# Patient Record
Sex: FEMALE | Race: BLACK OR AFRICAN AMERICAN | NOT HISPANIC OR LATINO | Employment: FULL TIME | URBAN - METROPOLITAN AREA
[De-identification: names, ages, dates, MRNs, and addresses within clinical notes are randomized per-mention and may not be internally consistent; named-entity substitution may affect disease eponyms.]

---

## 2017-01-15 ENCOUNTER — HOSPITAL ENCOUNTER (EMERGENCY)
Facility: HOSPITAL | Age: 37
Discharge: HOME | End: 2017-01-15
Attending: EMERGENCY MEDICINE | Admitting: EMERGENCY MEDICINE
Payer: MEDICARE

## 2017-01-15 ENCOUNTER — HOSPITAL ENCOUNTER (EMERGENCY)
Facility: HOSPITAL | Age: 37
Discharge: HOME | End: 2017-01-15
Attending: EMERGENCY MEDICINE
Payer: COMMERCIAL

## 2017-01-15 ENCOUNTER — APPOINTMENT (EMERGENCY)
Dept: RADIOLOGY | Facility: HOSPITAL | Age: 37
End: 2017-01-15
Payer: COMMERCIAL

## 2017-01-15 VITALS
WEIGHT: 225.6 LBS | OXYGEN SATURATION: 100 % | TEMPERATURE: 97.9 F | BODY MASS INDEX: 34.3 KG/M2 | HEART RATE: 100 BPM | RESPIRATION RATE: 18 BRPM | DIASTOLIC BLOOD PRESSURE: 92 MMHG | SYSTOLIC BLOOD PRESSURE: 141 MMHG

## 2017-01-15 VITALS
SYSTOLIC BLOOD PRESSURE: 144 MMHG | OXYGEN SATURATION: 97 % | DIASTOLIC BLOOD PRESSURE: 92 MMHG | RESPIRATION RATE: 18 BRPM | TEMPERATURE: 97.5 F | BODY MASS INDEX: 35.6 KG/M2 | WEIGHT: 234.13 LBS | HEART RATE: 89 BPM
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
## 2017-01-15 DIAGNOSIS — M25.561 ACUTE PAIN OF RIGHT KNEE: Primary | ICD-10-CM

## 2017-01-15 DIAGNOSIS — M25.461 EFFUSION OF RIGHT KNEE JOINT: ICD-10-CM

## 2017-01-15 DIAGNOSIS — S80.12XA: ICD-10-CM

## 2017-01-15 DIAGNOSIS — S00.81XA ABRASION OF FOREHEAD, INITIAL ENCOUNTER: Primary | ICD-10-CM

## 2017-01-15 PROCEDURE — 99283 EMERGENCY DEPT VISIT LOW MDM: CPT

## 2017-01-15 PROCEDURE — 73564 X-RAY EXAM KNEE 4 OR MORE: CPT

## 2017-01-15 PROCEDURE — 99284 EMERGENCY DEPT VISIT MOD MDM: CPT

## 2017-01-15 RX ORDER — GINSENG 100 MG
1 CAPSULE ORAL ONCE
Status: COMPLETED | OUTPATIENT
Start: 2017-01-15 | End: 2017-01-15

## 2017-01-15 RX ORDER — OXYCODONE HYDROCHLORIDE AND ACETAMINOPHEN 5; 325 MG/1; MG/1
1 TABLET ORAL ONCE
Status: COMPLETED | OUTPATIENT
Start: 2017-01-15 | End: 2017-01-15

## 2017-01-15 RX ORDER — IBUPROFEN 800 MG/1
800 TABLET ORAL EVERY 8 HOURS PRN
Qty: 21 TABLET | Refills: 0 | Status: SHIPPED | OUTPATIENT
Start: 2017-01-15 | End: 2021-02-23 | Stop reason: ALTCHOICE

## 2017-01-15 RX ORDER — HYDROCODONE BITARTRATE AND ACETAMINOPHEN 5; 325 MG/1; MG/1
1 TABLET ORAL EVERY 6 HOURS PRN
Qty: 20 TABLET | Refills: 0 | Status: SHIPPED | OUTPATIENT
Start: 2017-01-15 | End: 2017-01-25

## 2017-01-15 RX ADMIN — OXYCODONE HYDROCHLORIDE AND ACETAMINOPHEN 1 TABLET: 5; 325 TABLET ORAL at 17:55

## 2017-01-15 RX ADMIN — BACITRACIN ZINC 1 SMALL APPLICATION: 500 OINTMENT TOPICAL at 23:25

## 2019-11-06 ENCOUNTER — HOSPITAL ENCOUNTER (EMERGENCY)
Facility: HOSPITAL | Age: 39
Discharge: HOME | End: 2019-11-06
Attending: EMERGENCY MEDICINE | Admitting: EMERGENCY MEDICINE
Payer: MEDICARE

## 2019-11-06 VITALS
OXYGEN SATURATION: 99 % | DIASTOLIC BLOOD PRESSURE: 95 MMHG | BODY MASS INDEX: 29.67 KG/M2 | WEIGHT: 195.11 LBS | HEART RATE: 86 BPM | RESPIRATION RATE: 18 BRPM | TEMPERATURE: 97.4 F | SYSTOLIC BLOOD PRESSURE: 128 MMHG

## 2019-11-06 DIAGNOSIS — N92.1 MENOMETRORRHAGIA: Primary | ICD-10-CM

## 2019-11-06 LAB
ANION GAP SERPL CALCULATED.3IONS-SCNC: 12 MMOL/L (ref 4–13)
BASOPHILS # BLD AUTO: 0.03 THOUSANDS/ΜL (ref 0–0.1)
BASOPHILS NFR BLD AUTO: 1 % (ref 0–1)
BUN SERPL-MCNC: 7 MG/DL (ref 5–25)
CALCIUM SERPL-MCNC: 8.5 MG/DL (ref 8.3–10.1)
CHLORIDE SERPL-SCNC: 105 MMOL/L (ref 100–108)
CO2 SERPL-SCNC: 22 MMOL/L (ref 21–32)
CREAT SERPL-MCNC: 0.69 MG/DL (ref 0.6–1.3)
EOSINOPHIL # BLD AUTO: 0.11 THOUSAND/ΜL (ref 0–0.61)
EOSINOPHIL NFR BLD AUTO: 2 % (ref 0–6)
ERYTHROCYTE [DISTWIDTH] IN BLOOD BY AUTOMATED COUNT: 12.2 % (ref 11.6–15.1)
EXT PREG TEST URINE: NEGATIVE
EXT. CONTROL ED NAV: NORMAL
GFR SERPL CREATININE-BSD FRML MDRD: 126 ML/MIN/1.73SQ M
GLUCOSE SERPL-MCNC: 84 MG/DL (ref 65–140)
HCT VFR BLD AUTO: 45.6 % (ref 34.8–46.1)
HGB BLD-MCNC: 15.1 G/DL (ref 11.5–15.4)
IMM GRANULOCYTES # BLD AUTO: 0.01 THOUSAND/UL (ref 0–0.2)
IMM GRANULOCYTES NFR BLD AUTO: 0 % (ref 0–2)
LYMPHOCYTES # BLD AUTO: 2 THOUSANDS/ΜL (ref 0.6–4.47)
LYMPHOCYTES NFR BLD AUTO: 38 % (ref 14–44)
MCH RBC QN AUTO: 32.1 PG (ref 26.8–34.3)
MCHC RBC AUTO-ENTMCNC: 33.1 G/DL (ref 31.4–37.4)
MCV RBC AUTO: 97 FL (ref 82–98)
MONOCYTES # BLD AUTO: 0.49 THOUSAND/ΜL (ref 0.17–1.22)
MONOCYTES NFR BLD AUTO: 9 % (ref 4–12)
NEUTROPHILS # BLD AUTO: 2.67 THOUSANDS/ΜL (ref 1.85–7.62)
NEUTS SEG NFR BLD AUTO: 50 % (ref 43–75)
NRBC BLD AUTO-RTO: 0 /100 WBCS
PLATELET # BLD AUTO: 215 THOUSANDS/UL (ref 149–390)
PMV BLD AUTO: 12.2 FL (ref 8.9–12.7)
POTASSIUM SERPL-SCNC: 4 MMOL/L (ref 3.5–5.3)
RBC # BLD AUTO: 4.71 MILLION/UL (ref 3.81–5.12)
SODIUM SERPL-SCNC: 139 MMOL/L (ref 136–145)
WBC # BLD AUTO: 5.31 THOUSAND/UL (ref 4.31–10.16)

## 2019-11-06 PROCEDURE — 81025 URINE PREGNANCY TEST: CPT | Performed by: EMERGENCY MEDICINE

## 2019-11-06 PROCEDURE — 85025 COMPLETE CBC W/AUTO DIFF WBC: CPT | Performed by: EMERGENCY MEDICINE

## 2019-11-06 PROCEDURE — 99283 EMERGENCY DEPT VISIT LOW MDM: CPT

## 2019-11-06 PROCEDURE — 96361 HYDRATE IV INFUSION ADD-ON: CPT

## 2019-11-06 PROCEDURE — 96374 THER/PROPH/DIAG INJ IV PUSH: CPT

## 2019-11-06 PROCEDURE — 36415 COLL VENOUS BLD VENIPUNCTURE: CPT | Performed by: EMERGENCY MEDICINE

## 2019-11-06 PROCEDURE — 99284 EMERGENCY DEPT VISIT MOD MDM: CPT | Performed by: EMERGENCY MEDICINE

## 2019-11-06 PROCEDURE — 80048 BASIC METABOLIC PNL TOTAL CA: CPT | Performed by: EMERGENCY MEDICINE

## 2019-11-06 RX ORDER — KETOROLAC TROMETHAMINE 30 MG/ML
15 INJECTION, SOLUTION INTRAMUSCULAR; INTRAVENOUS ONCE
Status: COMPLETED | OUTPATIENT
Start: 2019-11-06 | End: 2019-11-06

## 2019-11-06 RX ADMIN — SODIUM CHLORIDE 1000 ML: 0.9 INJECTION, SOLUTION INTRAVENOUS at 09:34

## 2019-11-06 RX ADMIN — KETOROLAC TROMETHAMINE 15 MG: 30 INJECTION, SOLUTION INTRAMUSCULAR at 09:35

## 2019-11-06 NOTE — ED NOTES
Pt appeared to be in no acute distress upon discharge  Verbal understanding obtained from pt on d/c instructions as well as Rx and follow up care  Pt able to ambulate well without assistance upon exiting       Nicholas Vyas RN  11/06/19 5462

## 2019-11-06 NOTE — ED PROVIDER NOTES
History  Chief Complaint   Patient presents with    Menorrhagia     Pt presents to the ED with reports of heavy vaginal bleeding  Pt c/o of weakness  Pt reports never having heavy menstruations like this  Patient is a 44-year-old female who presents to the emergency department for evaluation with heavy vaginal bleeding  She relates that she started her period on Sunday (3 days ago)  This was usual timing for her menses to occur  She notes however that the last 2 days the bleeding has been much heavier than usual   She is fully soaking through 6 pads daily and notices some clots dripping into the toilet  She had the blood gush is out upon standing/position changes  Today she feels extremely weak and tired  She notes that she has never had similar menses  She relates that her menses are normally very regular and somewhat heavy but not to this degree  She does notice more cramping than usual across the entire pelvic area with this episode of bleeding  She denies any history of ovarian cysts or other pelvic problems  No history of anemia  She denies any current medical conditions for which she is on medication  She is not on any anti-platelet or anticoagulant medicines  She notes that her mother had endometriosis with menses occurring extremely heavily approximately every 3 months  Patient does visit her OBGYN-Dr Cristobal Peter regularly  Prior to Admission Medications   Prescriptions Last Dose Informant Patient Reported? Taking?    ALPRAZolam (XANAX) 0 25 mg tablet Not Taking at Unknown time  No No   Sig: Take 1 tablet by mouth 4 (four) times a day At 9AM, 12PM, 6PM & 10PM   Patient not taking: Reported on 11/6/2019   QUEtiapine (SEROquel) 300 mg tablet Not Taking at Unknown time  No No   Sig: Take 1 tablet by mouth daily at bedtime   Patient not taking: Reported on 11/6/2019   QUEtiapine (SEROquel) 50 mg tablet Not Taking at Unknown time  No No   Sig: Take 1 tablet by mouth daily At 9AM   Patient not taking: Reported on 11/6/2019   ibuprofen (MOTRIN) 800 mg tablet Not Taking at Unknown time  No No   Sig: Take 1 tablet by mouth every 8 (eight) hours as needed for mild pain or moderate pain for up to 10 days   Patient not taking: Reported on 11/6/2019   lamoTRIgine (LaMICtal) 25 mg tablet Not Taking at Unknown time  No No   Sig: Take 1 tablet by mouth daily At 9AM   Patient not taking: Reported on 11/6/2019   traMADol (ULTRAM) 50 mg tablet Not Taking at Unknown time  No No   Sig: Take 1 tablet by mouth every 6 (six) hours as needed for moderate pain   Patient not taking: Reported on 11/6/2019   traZODone (DESYREL) 50 mg tablet Not Taking at Unknown time  No No   Sig: Take 1 tablet by mouth daily at bedtime as needed for sleep   Patient not taking: Reported on 11/6/2019      Facility-Administered Medications: None       Past Medical History:   Diagnosis Date    Anxiety     [facility] disorder ([facility] )     Depression     Psychiatric illness     Suicide attempt ([place] [id number] )        History reviewed  No pertinent surgical history  Family History   Problem Relation Age of Onset    Suicidality Mother     Depression Mother      I have reviewed and agree with the history as documented  Social History     Tobacco Use    Smoking status: Current Every Day Smoker     Packs/day: 0 50     Years: 15 00     Pack years: 7 50    Smokeless tobacco: Never Used   Substance Use Topics    Alcohol use: Yes     Comment: Drinks 1-2 drinks 1-2 X yearly    Drug use: Not Currently     Types: Cocaine        Review of Systems   Respiratory: Positive for chest tightness (Patient describes mild squeezing sensation across the chest today)  Negative for shortness of breath  Cardiovascular: Negative for leg swelling  All other systems reviewed and are negative  Physical Exam  Physical Exam   Constitutional: She is oriented to person, place, and time  She appears well-nourished  HENT:   Head: Normocephalic     Eyes: Conjunctivae and EOM are normal    Cardiovascular: Normal rate and regular rhythm  Pulmonary/Chest: Effort normal and breath sounds normal    Abdominal: Soft  Bowel sounds are normal  There is tenderness (Mild across the entire lower abdomen without guarding)  Genitourinary:   Genitourinary Comments: Speculum exam performed  Small to moderate amounts of liquid blood appreciated within the vaginal vault  No active bleeding on exam   No clot appreciated within the cervical os  Cervix is smooth and without appreciable lesions  Musculoskeletal: Normal range of motion  Neurological: She is alert and oriented to person, place, and time  Skin: Skin is warm and dry  Psychiatric: She has a normal mood and affect  Her behavior is normal    Nursing note and vitals reviewed        Vital Signs  ED Triage Vitals   Temperature Pulse Respirations Blood Pressure SpO2   11/06/19 0857 11/06/19 0854 11/06/19 0854 11/06/19 0854 11/06/19 0854   (!) 97 4 °F (36 3 °C) 86 18 128/95 99 %      Temp Source Heart Rate Source Patient Position - Orthostatic VS BP Location FiO2 (%)   11/06/19 0857 11/06/19 0854 11/06/19 0854 11/06/19 0854 --   Oral Monitor Sitting Left arm       Pain Score       11/06/19 0854       3           Vitals:    11/06/19 0854   BP: 128/95   Pulse: 86   Patient Position - Orthostatic VS: Sitting         Visual Acuity      ED Medications  Medications   sodium chloride 0 9 % bolus 1,000 mL (0 mL Intravenous Stopped 11/6/19 1038)   ketorolac (TORADOL) injection 15 mg (15 mg Intravenous Given 11/6/19 0935)       Diagnostic Studies  Results Reviewed     Procedure Component Value Units Date/Time    Basic metabolic panel [[id number]] Collected:  11/06/19 0934    Lab Status:  Final result Specimen:  Blood from Arm, Left Updated:  11/06/19 0952     Sodium 139 mmol/L      Potassium 4 0 mmol/L      Chloride 105 mmol/L      CO2 22 mmol/L      ANION GAP 12 mmol/L      BUN 7 mg/dL      Creatinine 0 69 mg/dL      Glucose 84 mg/dL      Calcium 8 5 mg/dL      eGFR 126 ml/min/1 73sq m     Narrative:       [place] guidelines for Chronic Kidney Disease (CKD):     Stage 1 with normal or high GFR (GFR > 90 mL/min/1 73 square meters)    Stage 2 Mild CKD (GFR = 60-89 mL/min/1 73 square meters)    Stage 3A Moderate CKD (GFR = 45-59 mL/min/1 73 square meters)    Stage 3B Moderate CKD (GFR = 30-44 mL/min/1 73 square meters)    Stage 4 Severe CKD (GFR = 15-29 mL/min/1 73 square meters)    Stage 5 End Stage CKD (GFR <15 mL/min/1 73 square meters)  Note: GFR calculation is accurate only with a steady state creatinine    CBC and differential [[id number]] Collected:  11/06/19 0934    Lab Status:  Final result Specimen:  Blood from Arm, Left Updated:  11/06/19 0946     WBC 5 31 Thousand/uL      RBC 4 71 Million/uL      Hemoglobin 15 1 g/dL      Hematocrit 45 6 %      MCV 97 fL      MCH 32 1 pg      MCHC 33 1 g/dL      RDW 12 2 %      MPV 12 2 fL      Platelets 864 Thousands/uL      nRBC 0 /100 WBCs      Neutrophils Relative 50 %      Immat GRANS % 0 %      Lymphocytes Relative 38 %      Monocytes Relative 9 %      Eosinophils Relative 2 %      Basophils Relative 1 %      Neutrophils Absolute 2 67 Thousands/µL      Immature Grans Absolute 0 01 Thousand/uL      Lymphocytes Absolute 2 00 Thousands/µL      Monocytes Absolute 0 49 Thousand/µL      Eosinophils Absolute 0 11 Thousand/µL      Basophils Absolute 0 03 Thousands/µL     POCT pregnancy, urine [[id number]]  (Normal) Resulted:  11/06/19 0929    Lab Status:  Final result Updated:  11/06/19 0929     EXT PREG TEST UR (Ref: Negative) negative     Control valid                 No orders to display              Procedures  Procedures       ED Course  ED Course as of Nov 06 1253   Wed Nov 06, 2019   1005 Patient reports not feeling much different at this time  She was updated on study results which revealed negative pregnancy, normal electrolytes as well as hemoglobin/lack of anemia    Pelvic exam performed  No concerning appearance to the cervix, clot trapped within the os or alternate source of bleeding  Encouraged patient to use OTC medications including acetaminophen and ibuprofen to help with discomfort as well as slightly less in bleeding  Encouraged her to follow up with her OBGYN and to return p r n  Worsening  Discussed signs/symptoms/increased bleeding for which to return  Patient demonstrated understanding  Work note will be provided  MDM    Disposition  Final diagnoses:   Menometrorrhagia     Time reflects when diagnosis was documented in both MDM as applicable and the Disposition within this note     Time User Action Codes Description Comment    11/6/2019 12:53 PM Kamilah HODGSON Add [N92 1] Menometrorrhagia       ED Disposition     ED Disposition Condition Date/Time Comment    Discharge Stable Wed Nov 6, 2019  9:55 AM Brandie Alexander discharge to home/self care              Follow-up Information     Follow up With Specialties Details Why Contact Info Additional Information    Janet Pagan MD Obstetrics and Gynecology, Obstetrics, Gynecology Schedule an appointment as soon as possible for a visit   [address] Emergency Department Emergency Medicine Go to  As needed, If symptoms worsen [address] [address] [phone], [address], [place], [place], [address]          Discharge Medication List as of 11/6/2019 10:13 AM      CONTINUE these medications which have NOT CHANGED    Details   ALPRAZolam (XANAX) 0 25 mg tablet Take 1 tablet by mouth 4 (four) times a day At 9AM, 12PM, 6PM & 10PM, Starting 12/1/2016, Until Discontinued, Print      ibuprofen (MOTRIN) 800 mg tablet Take 1 tablet by mouth every 8 (eight) hours as needed for mild pain or moderate pain for up to 10 days, Starting Sun 1/15/2017, Until Wed 11/6/2019, Normal      lamoTRIgine (LaMICtal) 25 mg tablet Take 1 tablet by mouth daily At 9AM, Starting 12/1/2016, Until Discontinued, Print      !! QUEtiapine (SEROquel) 300 mg tablet Take 1 tablet by mouth daily at bedtime, Starting 12/1/2016, Until Discontinued, Print      !! QUEtiapine (SEROquel) 50 mg tablet Take 1 tablet by mouth daily At 9AM, Starting 12/1/2016, Until Discontinued, Print      traMADol (ULTRAM) 50 mg tablet Take 1 tablet by mouth every 6 (six) hours as needed for moderate pain, Starting 12/1/2016, Until Discontinued, Print      traZODone (DESYREL) 50 mg tablet Take 1 tablet by mouth daily at bedtime as needed for sleep, Starting 12/1/2016, Until Discontinued, Print       !! - Potential duplicate medications found  Please discuss with provider  No discharge procedures on file      ED Provider  Electronically Signed by           Shelly Masterson MD  11/06/19 0998 Benito Laguna MD  11/06/19 8735

## 2019-11-06 NOTE — DISCHARGE INSTRUCTIONS
You may take ibuprofen 400 mg every 4 to 6 hours as needed for discomfort in addition to acetaminophen as directed on over-the-counter packaging  Drink plenty of fluids to stay well hydrated and schedule an appointment for follow-up with Dr Paolo Santos if you have significant worsening/new concerns  Dysfunctional Uterine Bleeding   WHAT YOU NEED TO KNOW:   Dysfunctional uterine bleeding (DUB) is abnormal uterine bleeding that is caused by a problem with your hormones  You may have bleeding from your uterus at times other than your normal monthly period  Your monthly periods may last longer or shorter, and bleeding may be heavier or lighter than usual    DISCHARGE INSTRUCTIONS:   Medicines:   Hormones  help decrease bleeding by making your monthly periods more regular  Sometimes this medicine may be given as birth control pills  NSAIDs  help decrease swelling, pain, and fever  This medicine is available with or without a doctor's order  NSAIDs can cause stomach bleeding or kidney problems in certain people  If you take blood thinner medicine, always ask your healthcare provider if NSAIDs are safe for you  Always read the medicine label and follow directions  Iron supplements  may be given if your blood iron level decreases because of heavy bleeding  Iron may make you constipated  Ask your healthcare provider for ways to prevent or treat constipation  Iron may also make your bowel movements turn dark or black  Take your medicine as directed  Contact your healthcare provider if you think your medicine is not helping or if you have side effects  Tell him or her if you are allergic to any medicine  Keep a list of the medicines, vitamins, and herbs you take  Include the amounts, and when and why you take them  Bring the list or the pill bottles to follow-up visits  Carry your medicine list with you in case of an emergency    Follow up with your healthcare provider as directed:  Write down your questions so you remember to ask them during your visits  Self-care:   Apply heat  on your lower abdomen for 20 to 30 minutes every 2 hours for as many days as directed  Heat helps decrease pain and muscle spasms  Include foods high in iron  if needed  Examples of foods high in iron are leafy green vegetables, beef, pork, liver, eggs, and whole-grain breads and cereals  Keep a diary of your menstrual cycles  Keep track of the number of tampons or pads you use each day  Talk to your healthcare provider before you start a weight loss program   You may need to wait until the abnormal bleeding has stopped before you try to lose weight  The amount of iron in your blood should be normal before you lose weight  Contact your healthcare provider if:   You need to change your sanitary pad or tampon more than once an hour  Your medicine causes nausea, vomiting, or diarrhea  You have questions or concerns about your condition or care  Seek care immediately or call 911 if:   You continue to bleed heavily, or you feel faint  © 2017 [address] Information is for End User's use only and may not be sold, redistributed or otherwise used for commercial purposes  All illustrations and images included in CareNotes® are the copyrighted property of A D A M [employer] Inc  or Kelvin Christy  The above information is an  only  It is not intended as medical advice for individual conditions or treatments  Talk to your doctor, nurse or pharmacist before following any medical regimen to see if it is safe and effective for you

## 2021-02-23 ENCOUNTER — HOSPITAL ENCOUNTER (EMERGENCY)
Facility: HOSPITAL | Age: 41
Discharge: HOME | End: 2021-02-23
Payer: COMMERCIAL

## 2021-02-23 VITALS
OXYGEN SATURATION: 100 % | WEIGHT: 210 LBS | BODY MASS INDEX: 31.83 KG/M2 | HEART RATE: 73 BPM | HEIGHT: 68 IN | SYSTOLIC BLOOD PRESSURE: 147 MMHG | DIASTOLIC BLOOD PRESSURE: 85 MMHG | TEMPERATURE: 97.8 F | RESPIRATION RATE: 18 BRPM

## 2021-02-23 DIAGNOSIS — H60.02 ABSCESS OF LEFT EARLOBE: Primary | ICD-10-CM

## 2021-02-23 PROCEDURE — 99284 EMERGENCY DEPT VISIT MOD MDM: CPT

## 2021-02-23 PROCEDURE — 99282 EMERGENCY DEPT VISIT SF MDM: CPT

## 2021-02-23 PROCEDURE — 69000 DRG XTRNL EAR ABSC/HEM SMPL: CPT

## 2021-02-23 RX ORDER — LIDOCAINE HYDROCHLORIDE 10 MG/ML
5 INJECTION, SOLUTION EPIDURAL; INFILTRATION; INTRACAUDAL; PERINEURAL ONCE
Status: COMPLETED | OUTPATIENT
Start: 2021-02-23 | End: 2021-02-23

## 2021-02-23 RX ORDER — CEPHALEXIN 250 MG/1
500 CAPSULE ORAL ONCE
Status: COMPLETED | OUTPATIENT
Start: 2021-02-23 | End: 2021-02-23

## 2021-02-23 RX ORDER — CEPHALEXIN 250 MG/1
500 CAPSULE ORAL EVERY 8 HOURS SCHEDULED
Qty: 21 CAPSULE | Refills: 0 | Status: SHIPPED | OUTPATIENT
Start: 2021-02-23 | End: 2021-03-02

## 2021-02-23 RX ADMIN — LIDOCAINE HYDROCHLORIDE 5 ML: 10 INJECTION, SOLUTION EPIDURAL; INFILTRATION; INTRACAUDAL; PERINEURAL at 13:18

## 2021-02-23 RX ADMIN — CEPHALEXIN 500 MG: 250 CAPSULE ORAL at 13:17

## 2021-02-23 NOTE — ED PROVIDER NOTES
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 08/23/2019    Bipolar disorder ([address] )     Depression     History of mammogram 08/30/2019    Normal     HPV (human papilloma virus) infection 08/23/2019    Papanicolaou smear for cervical cancer screening 08/15/2019    ASCUS; HPV pos 2019     Psychiatric illness     Suicide attempt [facility])        Past Surgical History:   Procedure Laterality Date    COLPOSCOPY      x2 while pregnant        Family History   Problem Relation Age of Onset    Suicidality Mother     Depression Mother     No Known Problems Father      I have reviewed and agree with the history as documented  E-Cigarette/Vaping    E-Cigarette Use Never User      E-Cigarette/Vaping Substances     Social History     Tobacco Use    Smoking status: Current Every Day Smoker     Packs/day: 0 50     Years: 15 00     Pack years: 7 50    Smokeless tobacco: Never Used   Substance Use Topics    Alcohol use: Yes     Comment: Drinks 1-2 drinks 1-2 X yearly    Drug use: Not Currently     Types: Cocaine     Comment: Denies illicit drugs - As per [place]        Review of Systems   Skin: Positive for wound  All other systems reviewed and are negative  Physical Exam  Physical Exam  Vitals signs and nursing note reviewed  Exam conducted with a chaperone present     Constitutional:       Appearance: Normal appearance  She is normal weight  HENT:      Head: Normocephalic and atraumatic  Left Ear: Tympanic membrane normal       Ears:      Comments: Patient has what appears to be a 0 5-1 cm abscess right at the superior aspect of the or call as is attach is to the scalp  It is fluctuant and tender     Mouth/Throat:      Mouth: Mucous membranes are dry  Eyes:      Extraocular Movements: Extraocular movements intact  Conjunctiva/sclera: Conjunctivae normal       Pupils: Pupils are equal, round, and reactive to light  Neck:      Musculoskeletal: Normal range of motion and neck supple  No muscular tenderness  Lymphadenopathy:      Cervical: No cervical adenopathy  Neurological:      Mental Status: She is alert  Vital Signs  ED Triage Vitals   Temperature Pulse Respirations Blood Pressure SpO2   02/23/21 1303 02/23/21 1301 02/23/21 1301 02/23/21 1301 02/23/21 1301   97 8 °F (36 6 °C) 73 18 147/85 100 %      Temp Source Heart Rate Source Patient Position - Orthostatic VS BP Location FiO2 (%)   02/23/21 1303 02/23/21 1301 02/23/21 1301 02/23/21 1301 --   Oral Monitor Sitting Left arm       Pain Score       02/23/21 1301       Worst Possible Pain           Vitals:    02/23/21 1301   BP: 147/85   Pulse: 73   Patient Position - Orthostatic VS: Sitting         Visual Acuity      ED Medications  Medications   lidocaine (PF) (XYLOCAINE-MPF) 1 % injection 5 mL (5 mL Infiltration Given by Other 2/23/21 1318)   cephalexin (KEFLEX) capsule 500 mg (500 mg Oral Given 2/23/21 1317)       Diagnostic Studies  Results Reviewed     None                 No orders to display              Procedures  Incision and drain    Date/Time: 2/23/2021 1:29 PM  Performed by: Miguelangel Carvalho MD  Authorized by: Miguelangel Carvalho MD   Universal Protocol:  Procedure performed by:  Consent: Verbal consent obtained    Consent given by: patient  Timeout called at: 2/23/2021 1:29 PM   Patient understanding: patient states understanding of the procedure being performed  Patient consent: the patient's understanding of the procedure matches consent given  Procedure consent: procedure consent matches procedure scheduled  Test results: test results available and properly labeled  Site marked: the operative site was marked  Radiology Images displayed and confirmed  If images not available, report reviewed: imaging studies not available  Patient identity confirmed: verbally with patient      Patient location:  ED  Location:     Type:  Abscess    Location:  Head/neck    Head/neck location:  L external ear  Sedation:     Sedation type: Anxiolysis  Procedure details:     Complexity:  Simple    Needle aspiration: no      Incision types:  Stab incision    Scalpel blade:  15    Approach:  Open    Incision depth:  Submucosal    Drainage:  Purulent    Drainage amount: Moderate    Wound treatment:  Wound left open  Post-procedure details:     Patient tolerance of procedure: Tolerated well, no immediate complications             ED Course                             SBIRT 22yo+      Most Recent Value   SBIRT (24 yo +)   In order to provide better care to our patients, we are screening all of our patients for alcohol and drug use  Would it be okay to ask you these screening questions? Unable to answer at this time Filed at: 02/23/2021 1313                    [facility]  Number of Diagnoses or Management Options  Diagnosis management comments: Area was cleaned with Betadine procedure for I and D as per procedure note  Patient tolerated procedure well  Plan is Keflex 500 mg t i d   For the next 7 days local warm compresses follow up with primary care physician if things are not improving within the week return to the ER for any worsening complications      Disposition  Final diagnoses:   Abscess of left earlobe     Time reflects when diagnosis was documented in both MDM as applicable and the Disposition within this note     Time User Action Codes Description Comment    2/23/2021  1:37 PM Angelina Salazar Add [H60 02] Abscess of left earlobe       ED Disposition     ED Disposition Condition Date/Time Comment    Discharge Stable Tue Feb 23, 2021  1:37 PM [facility] discharge to home/self care  Follow-up Information     Follow up With Specialties Details Why Contact Info    Maninder Acosta MD [facility] Medicine Schedule an appointment as soon as possible for a visit in 3 days If symptoms worsen [address]            Patient's Medications   Discharge Prescriptions    No medications on file     No discharge procedures on file      PDMP Review     None          ED Provider  Electronically Signed by           Shyanne Lucero MD  02/23/21 1314       Shyanne Lucero MD  02/23/21 6011

## 2021-10-06 ENCOUNTER — OFFICE VISIT (OUTPATIENT)
Dept: FAMILY MEDICINE CLINIC | Facility: OTHER | Age: 41
End: 2021-10-06
Payer: COMMERCIAL

## 2021-10-06 VITALS
WEIGHT: 194.4 LBS | SYSTOLIC BLOOD PRESSURE: 122 MMHG | HEIGHT: 68 IN | DIASTOLIC BLOOD PRESSURE: 76 MMHG | TEMPERATURE: 98 F | OXYGEN SATURATION: 96 % | RESPIRATION RATE: 18 BRPM | BODY MASS INDEX: 29.46 KG/M2 | HEART RATE: 86 BPM

## 2021-10-06 DIAGNOSIS — Z11.4 ENCOUNTER FOR SCREENING FOR HIV: ICD-10-CM

## 2021-10-06 DIAGNOSIS — Z11.59 NEED FOR HEPATITIS C SCREENING TEST: ICD-10-CM

## 2021-10-06 DIAGNOSIS — Z13.828 RHEUMATOID ARTHRITIS SCREEN: ICD-10-CM

## 2021-10-06 DIAGNOSIS — M25.552 BILATERAL HIP PAIN: Primary | ICD-10-CM

## 2021-10-06 DIAGNOSIS — Z23 23-POLYVALENT PNEUMOCOCCAL POLYSACCHARIDE VACCINE ADMINISTERED: ICD-10-CM

## 2021-10-06 DIAGNOSIS — M25.551 BILATERAL HIP PAIN: Primary | ICD-10-CM

## 2021-10-06 DIAGNOSIS — Z23 FLU VACCINE NEED: ICD-10-CM

## 2021-10-06 PROCEDURE — 99204 OFFICE O/P NEW MOD 45 MIN: CPT | Performed by: FAMILY MEDICINE

## 2021-10-06 PROCEDURE — 3008F BODY MASS INDEX DOCD: CPT | Performed by: FAMILY MEDICINE

## 2021-10-06 RX ORDER — METHOCARBAMOL 500 MG/1
500 TABLET, FILM COATED ORAL
Qty: 30 TABLET | Refills: 0 | Status: SHIPPED | OUTPATIENT
Start: 2021-10-06 | End: 2021-11-10 | Stop reason: SINTOL

## 2021-10-11 PROCEDURE — 90472 IMMUNIZATION ADMIN EACH ADD: CPT

## 2021-10-11 PROCEDURE — 90686 IIV4 VACC NO PRSV 0.5 ML IM: CPT

## 2021-10-11 PROCEDURE — 90732 PPSV23 VACC 2 YRS+ SUBQ/IM: CPT

## 2021-10-11 PROCEDURE — 90471 IMMUNIZATION ADMIN: CPT

## 2021-10-13 ENCOUNTER — LAB (OUTPATIENT)
Dept: LAB | Facility: HOSPITAL | Age: 41
End: 2021-10-13
Payer: COMMERCIAL

## 2021-10-13 DIAGNOSIS — Z13.828 RHEUMATOID ARTHRITIS SCREEN: ICD-10-CM

## 2021-10-13 DIAGNOSIS — M25.551 BILATERAL HIP PAIN: ICD-10-CM

## 2021-10-13 DIAGNOSIS — Z11.4 ENCOUNTER FOR SCREENING FOR HIV: ICD-10-CM

## 2021-10-13 DIAGNOSIS — M25.552 BILATERAL HIP PAIN: ICD-10-CM

## 2021-10-13 DIAGNOSIS — Z11.59 NEED FOR HEPATITIS C SCREENING TEST: ICD-10-CM

## 2021-10-13 LAB
ALBUMIN SERPL BCP-MCNC: 4 G/DL (ref 3.4–4.8)
ALP SERPL-CCNC: 72 U/L (ref 35–140)
ALT SERPL W P-5'-P-CCNC: 26 U/L (ref 5–54)
ANION GAP SERPL CALCULATED.3IONS-SCNC: 6 MMOL/L (ref 4–13)
AST SERPL W P-5'-P-CCNC: 39 U/L (ref 15–41)
BASOPHILS # BLD MANUAL: 0 THOUSAND/UL (ref 0–0.1)
BASOPHILS NFR MAR MANUAL: 0 % (ref 0–1)
BILIRUB SERPL-MCNC: 0.78 MG/DL (ref 0.3–1.2)
BILIRUB UR QL STRIP: NEGATIVE
BUN SERPL-MCNC: 7 MG/DL (ref 6–20)
CALCIUM SERPL-MCNC: 9.1 MG/DL (ref 8.4–10.2)
CHLORIDE SERPL-SCNC: 102 MMOL/L (ref 96–108)
CHOLEST SERPL-MCNC: 221 MG/DL
CK SERPL-CCNC: 48.8 U/L (ref 38–234)
CLARITY UR: CLEAR
CO2 SERPL-SCNC: 26 MMOL/L (ref 22–33)
COLOR UR: YELLOW
CREAT SERPL-MCNC: 0.75 MG/DL (ref 0.4–1.1)
CRP SERPL QL: 0.3 MG/DL (ref 0–1)
EOSINOPHIL # BLD MANUAL: 0.07 THOUSAND/UL (ref 0–0.4)
EOSINOPHIL NFR BLD MANUAL: 1 % (ref 0–6)
ERYTHROCYTE [DISTWIDTH] IN BLOOD BY AUTOMATED COUNT: 12.3 % (ref 11.6–15.1)
ERYTHROCYTE [SEDIMENTATION RATE] IN BLOOD: 5 MM/HOUR (ref 0–20)
EST. AVERAGE GLUCOSE BLD GHB EST-MCNC: 103 MG/DL
GFR SERPL CREATININE-BSD FRML MDRD: 114 ML/MIN/1.73SQ M
GLUCOSE P FAST SERPL-MCNC: 93 MG/DL (ref 70–105)
GLUCOSE UR STRIP-MCNC: NEGATIVE MG/DL
HBA1C MFR BLD: 5.2 %
HCT VFR BLD AUTO: 46 % (ref 34.8–46.1)
HCV AB SER QL: NORMAL
HDLC SERPL-MCNC: 72 MG/DL
HGB BLD-MCNC: 15.7 G/DL (ref 11.5–15.4)
HGB UR QL STRIP.AUTO: NEGATIVE
KETONES UR STRIP-MCNC: NEGATIVE MG/DL
LDLC SERPL CALC-MCNC: 104 MG/DL (ref 0–100)
LEUKOCYTE ESTERASE UR QL STRIP: NEGATIVE
LYMPHOCYTES # BLD AUTO: 2.06 THOUSAND/UL (ref 0.6–4.47)
LYMPHOCYTES # BLD AUTO: 31 % (ref 14–44)
MCH RBC QN AUTO: 32.8 PG (ref 26.8–34.3)
MCHC RBC AUTO-ENTMCNC: 34.1 G/DL (ref 31.4–37.4)
MCV RBC AUTO: 96 FL (ref 82–98)
MONOCYTES # BLD AUTO: 0.46 THOUSAND/UL (ref 0–1.22)
MONOCYTES NFR BLD: 7 % (ref 4–12)
NEUTROPHILS # BLD MANUAL: 3.85 THOUSAND/UL (ref 1.85–7.62)
NEUTS SEG NFR BLD AUTO: 58 % (ref 43–75)
NITRITE UR QL STRIP: NEGATIVE
NONHDLC SERPL-MCNC: 149 MG/DL
PH UR STRIP.AUTO: 6 [PH]
PLATELET # BLD AUTO: 188 THOUSANDS/UL (ref 149–390)
PLATELET BLD QL SMEAR: ADEQUATE
PMV BLD AUTO: 12.4 FL (ref 8.9–12.7)
POTASSIUM SERPL-SCNC: 3.7 MMOL/L (ref 3.5–5)
PROT SERPL-MCNC: 7.9 G/DL (ref 6.4–8.3)
PROT UR STRIP-MCNC: NEGATIVE MG/DL
RBC # BLD AUTO: 4.78 MILLION/UL (ref 3.81–5.12)
RBC MORPH BLD: NORMAL
SODIUM SERPL-SCNC: 134 MMOL/L (ref 133–145)
SP GR UR STRIP.AUTO: 1.02 (ref 1–1.03)
TRIGL SERPL-MCNC: 226.1 MG/DL
TSH SERPL DL<=0.05 MIU/L-ACNC: 0.92 UIU/ML (ref 0.34–5.6)
UROBILINOGEN UR QL STRIP.AUTO: 0.2 E.U./DL
VARIANT LYMPHS # BLD AUTO: 3 %
WBC # BLD AUTO: 6.63 THOUSAND/UL (ref 4.31–10.16)

## 2021-10-13 PROCEDURE — 85652 RBC SED RATE AUTOMATED: CPT

## 2021-10-13 PROCEDURE — 80053 COMPREHEN METABOLIC PANEL: CPT

## 2021-10-13 PROCEDURE — 86140 C-REACTIVE PROTEIN: CPT

## 2021-10-13 PROCEDURE — 84443 ASSAY THYROID STIM HORMONE: CPT

## 2021-10-13 PROCEDURE — 86038 ANTINUCLEAR ANTIBODIES: CPT

## 2021-10-13 PROCEDURE — 87389 HIV-1 AG W/HIV-1&-2 AB AG IA: CPT

## 2021-10-13 PROCEDURE — 86803 HEPATITIS C AB TEST: CPT

## 2021-10-13 PROCEDURE — 81003 URINALYSIS AUTO W/O SCOPE: CPT

## 2021-10-13 PROCEDURE — 86200 CCP ANTIBODY: CPT

## 2021-10-13 PROCEDURE — 80061 LIPID PANEL: CPT

## 2021-10-13 PROCEDURE — 85027 COMPLETE CBC AUTOMATED: CPT

## 2021-10-13 PROCEDURE — 36415 COLL VENOUS BLD VENIPUNCTURE: CPT

## 2021-10-13 PROCEDURE — 82550 ASSAY OF CK (CPK): CPT

## 2021-10-13 PROCEDURE — 85007 BL SMEAR W/DIFF WBC COUNT: CPT

## 2021-10-13 PROCEDURE — 86430 RHEUMATOID FACTOR TEST QUAL: CPT

## 2021-10-13 PROCEDURE — 83036 HEMOGLOBIN GLYCOSYLATED A1C: CPT

## 2021-10-14 ENCOUNTER — HOSPITAL ENCOUNTER (OUTPATIENT)
Dept: RADIOLOGY | Facility: HOSPITAL | Age: 41
Discharge: HOME | End: 2021-10-14
Payer: COMMERCIAL

## 2021-10-14 DIAGNOSIS — M25.559 HIP PAIN: ICD-10-CM

## 2021-10-14 LAB — RHEUMATOID FACT SER QL LA: NEGATIVE

## 2021-10-14 PROCEDURE — 73522 X-RAY EXAM HIPS BI 3-4 VIEWS: CPT

## 2021-10-15 LAB — CCP AB SER IA-ACNC: 2.7

## 2021-10-18 LAB
HIV 1+2 AB+HIV1 P24 AG SERPL QL IA: NORMAL
RYE IGE QN: NEGATIVE

## 2021-11-10 ENCOUNTER — OFFICE VISIT (OUTPATIENT)
Dept: FAMILY MEDICINE CLINIC | Facility: OTHER | Age: 41
End: 2021-11-10
Payer: COMMERCIAL

## 2021-11-10 VITALS
SYSTOLIC BLOOD PRESSURE: 132 MMHG | WEIGHT: 195.6 LBS | RESPIRATION RATE: 18 BRPM | OXYGEN SATURATION: 98 % | HEART RATE: 84 BPM | BODY MASS INDEX: 29.64 KG/M2 | HEIGHT: 68 IN | DIASTOLIC BLOOD PRESSURE: 76 MMHG | TEMPERATURE: 97.8 F

## 2021-11-10 DIAGNOSIS — M25.552 BILATERAL HIP PAIN: Primary | ICD-10-CM

## 2021-11-10 DIAGNOSIS — M25.551 BILATERAL HIP PAIN: Primary | ICD-10-CM

## 2021-11-10 PROCEDURE — 3008F BODY MASS INDEX DOCD: CPT

## 2021-11-10 PROCEDURE — 99214 OFFICE O/P EST MOD 30 MIN: CPT

## 2021-11-10 RX ORDER — KETOROLAC TROMETHAMINE 10 MG/1
10 TABLET, FILM COATED ORAL DAILY
Qty: 30 TABLET | Refills: 2 | Status: SHIPPED | OUTPATIENT
Start: 2021-11-10 | End: 2022-04-05

## 2021-11-10 RX ORDER — METHYLPREDNISOLONE 4 MG/1
TABLET ORAL
Qty: 21 EACH | Refills: 0 | Status: SHIPPED | OUTPATIENT
Start: 2021-11-10 | End: 2022-04-05

## 2021-12-11 ENCOUNTER — HOSPITAL ENCOUNTER (EMERGENCY)
Facility: HOSPITAL | Age: 41
Discharge: HOME | End: 2021-12-11
Attending: EMERGENCY MEDICINE
Payer: COMMERCIAL

## 2021-12-11 ENCOUNTER — APPOINTMENT (EMERGENCY)
Dept: RADIOLOGY | Facility: HOSPITAL | Age: 41
End: 2021-12-11
Payer: COMMERCIAL

## 2021-12-11 VITALS
RESPIRATION RATE: 16 BRPM | DIASTOLIC BLOOD PRESSURE: 82 MMHG | WEIGHT: 196.21 LBS | TEMPERATURE: 97.9 F | OXYGEN SATURATION: 100 % | HEIGHT: 68 IN | SYSTOLIC BLOOD PRESSURE: 132 MMHG | BODY MASS INDEX: 29.74 KG/M2 | HEART RATE: 89 BPM

## 2021-12-11 DIAGNOSIS — S92.501A: Primary | ICD-10-CM

## 2021-12-11 PROCEDURE — 73630 X-RAY EXAM OF FOOT: CPT

## 2021-12-11 PROCEDURE — 99283 EMERGENCY DEPT VISIT LOW MDM: CPT

## 2021-12-11 PROCEDURE — 99284 EMERGENCY DEPT VISIT MOD MDM: CPT | Performed by: EMERGENCY MEDICINE

## 2021-12-11 RX ORDER — ACETAMINOPHEN 325 MG/1
975 TABLET ORAL ONCE
Status: COMPLETED | OUTPATIENT
Start: 2021-12-11 | End: 2021-12-11

## 2021-12-11 RX ORDER — IBUPROFEN 600 MG/1
600 TABLET ORAL ONCE
Status: COMPLETED | OUTPATIENT
Start: 2021-12-11 | End: 2021-12-11

## 2021-12-11 RX ADMIN — IBUPROFEN 600 MG: 600 TABLET ORAL at 18:07

## 2021-12-11 RX ADMIN — ACETAMINOPHEN 975 MG: 325 TABLET, FILM COATED ORAL at 18:07

## 2021-12-15 ENCOUNTER — OFFICE VISIT (OUTPATIENT)
Dept: PODIATRY | Facility: CLINIC | Age: 41
End: 2021-12-15
Payer: COMMERCIAL

## 2021-12-15 VITALS
DIASTOLIC BLOOD PRESSURE: 85 MMHG | BODY MASS INDEX: 30.11 KG/M2 | SYSTOLIC BLOOD PRESSURE: 122 MMHG | HEART RATE: 73 BPM | WEIGHT: 198 LBS

## 2021-12-15 DIAGNOSIS — S92.514A NONDISPLACED FRACTURE OF PROXIMAL PHALANX OF RIGHT LESSER TOE(S), INITIAL ENCOUNTER FOR CLOSED FRACTURE: Primary | ICD-10-CM

## 2021-12-15 PROCEDURE — 99244 OFF/OP CNSLTJ NEW/EST MOD 40: CPT | Performed by: PODIATRIST

## 2021-12-30 ENCOUNTER — HOSPITAL ENCOUNTER (EMERGENCY)
Facility: HOSPITAL | Age: 41
Discharge: HOME | End: 2021-12-30
Attending: INTERNAL MEDICINE | Admitting: INTERNAL MEDICINE
Payer: COMMERCIAL

## 2021-12-30 VITALS
OXYGEN SATURATION: 100 % | HEART RATE: 86 BPM | RESPIRATION RATE: 16 BRPM | TEMPERATURE: 98.6 F | SYSTOLIC BLOOD PRESSURE: 118 MMHG | DIASTOLIC BLOOD PRESSURE: 63 MMHG

## 2021-12-30 DIAGNOSIS — B34.9 ACUTE VIRAL SYNDROME: Primary | ICD-10-CM

## 2021-12-30 PROCEDURE — 99283 EMERGENCY DEPT VISIT LOW MDM: CPT

## 2021-12-30 PROCEDURE — 99284 EMERGENCY DEPT VISIT MOD MDM: CPT | Performed by: PHYSICIAN ASSISTANT

## 2021-12-30 PROCEDURE — 87636 SARSCOV2 & INF A&B AMP PRB: CPT | Performed by: PHYSICIAN ASSISTANT

## 2021-12-30 NOTE — ED PROVIDER NOTES
History  Chief Complaint   Patient presents with    Flu Symptoms     pt reports chills, cough, subjective fevers and body aches  took COVID test yest and was neg  states thinks she has flu  This is a 58-year-old female with past medical history significant for bipolar disorder presenting to the emergency department today for flu-like symptoms  Review of system is positive for chills, nonproductive cough, and subjective fevers  The patient also notes myalgias  Patient recently took a negative COVID test but believe she has influenza  She also notes some rhinorrhea and some nonbloody diarrhea  She is not vaccinated against COVID-19 or influenza  She notes a decreased appetite  No chest pain or shortness of breath  No dizziness, lightheadedness, or visual disturbances  No urinary symptoms  No nausea or vomiting  Patient is uncertain about sick contacts  The patient would like to be influenza tested here in the emergency department  The patient denies other complaints at this time  History provided by:  Patient   used: No    Flu Symptoms  Presenting symptoms: cough (non-productive), diarrhea (not bloody), fever (subjective only), myalgias and rhinorrhea    Presenting symptoms: no fatigue, no headaches, no nausea, no shortness of breath, no sore throat and no vomiting    Severity:  Mild  Onset quality:  Sudden  Duration:  2 days  Progression:  Unchanged  Chronicity:  New  Relieved by:  None tried  Worsened by:  Nothing  Ineffective treatments:  None tried  Associated symptoms: no chills, no decrease in physical activity, no ear pain, no congestion, no neck stiffness and no syncope        Prior to Admission Medications   Prescriptions Last Dose Informant Patient Reported?  Taking?   ketorolac (TORADOL) 10 mg tablet   No No   Sig: Take 1 tablet (10 mg total) by mouth daily   methylPREDNISolone 4 MG tablet therapy pack   No No   Sig: Use as directed on package   Patient not taking: Reported on 12/15/2021       Facility-Administered Medications: None       Past Medical History:   Diagnosis Date    Anxiety     Atypical squamous cell of undetermined significance of cervix 08/23/2019    [facility] disorder ([facility] )     Depression     History of mammogram 08/30/2019    Normal     HPV (human papilloma virus) infection 08/23/2019    Papanicolaou smear for cervical cancer screening 08/15/2019    ASCUS; HPV pos 2019     Psychiatric illness     [facility])        Past Surgical History:   Procedure Laterality Date    COLPOSCOPY      x2 while pregnant        Family History   Problem Relation Age of Onset    Suicidality Mother     Depression Mother     No Known Problems Father      I have reviewed and agree with the history as documented  E-Cigarette/Vaping    E-Cigarette Use Never User      E-Cigarette/Vaping Substances    Nicotine No     THC No     CBD No     Flavoring No     Other No     Unknown No      Social History     Tobacco Use    Smoking status: Current Every Day Smoker     Packs/day: 1 00     Years: 15 00     Pack years: 15 00    Smokeless tobacco: Never Used   Vaping Use    Vaping Use: Never used   Substance Use Topics    Alcohol use: Yes     Comment: Drinks 1-2 drinks 1-2 X yearly    Drug use: Not Currently     Types: Cocaine     Comment: Denies illicit drugs - As per [facility]        Review of Systems   Constitutional: Positive for fever (subjective only)  Negative for appetite change, chills and fatigue  HENT: Positive for rhinorrhea  Negative for congestion, ear discharge, ear pain, sinus pressure, sinus pain and sore throat  Eyes: Negative for visual disturbance  Respiratory: Positive for cough (non-productive)  Negative for chest tightness, shortness of breath and wheezing  Cardiovascular: Negative for chest pain and palpitations  Gastrointestinal: Positive for diarrhea (not bloody)  Negative for abdominal pain, constipation, nausea and vomiting  Genitourinary: Negative for dysuria and flank pain  Musculoskeletal: Positive for myalgias  Negative for neck pain and neck stiffness  Skin: Negative for rash and wound  Neurological: Negative for dizziness, seizures, syncope, weakness, light-headedness, numbness and headaches  Psychiatric/Behavioral: Negative for confusion  All other systems reviewed and are negative  Physical Exam  Physical Exam  Vitals and nursing note reviewed  Constitutional:       General: She is not in acute distress  Appearance: Normal appearance  She is normal weight  She is not ill-appearing, toxic-appearing or diaphoretic  HENT:      Head: Normocephalic and atraumatic  Right Ear: Tympanic membrane, ear canal and external ear normal  There is no impacted cerumen  Left Ear: Tympanic membrane, ear canal and external ear normal  There is no impacted cerumen  Nose: Nose normal  No congestion or rhinorrhea  Mouth/Throat:      Mouth: Mucous membranes are moist       Pharynx: No oropharyngeal exudate or posterior oropharyngeal erythema  Eyes:      General: No scleral icterus  Right eye: No discharge  Left eye: No discharge  Extraocular Movements: Extraocular movements intact  Conjunctiva/sclera: Conjunctivae normal    Neck:      Comments: Non meningeal  Cardiovascular:      Rate and Rhythm: Normal rate and regular rhythm  Pulses: Normal pulses  Heart sounds: Normal heart sounds  No murmur heard  No friction rub  No gallop  Comments: 2+ radial pulses bilaterally  Pulmonary:      Effort: Pulmonary effort is normal  No respiratory distress  Breath sounds: Normal breath sounds  No stridor  No wheezing, rhonchi or rales  Comments: Clear to auscultation bilaterally without adventitious breath sounds  Chest:      Chest wall: No tenderness  Abdominal:      General: Abdomen is flat  There is no distension  Palpations: Abdomen is soft        Tenderness: There is no abdominal tenderness  There is no right CVA tenderness, left CVA tenderness, guarding or rebound  Comments: Soft, nontender, nondistended, and without organomegaly   Musculoskeletal:         General: Normal range of motion  Cervical back: Normal range of motion  No tenderness  Right lower leg: No edema  Left lower leg: No edema  Comments: Negative Joseph sign bilaterally   Skin:     General: Skin is warm and dry  Capillary Refill: Capillary refill takes less than 2 seconds  Coloration: Skin is not jaundiced or pale  Neurological:      General: No focal deficit present  Mental Status: She is alert and oriented to person, place, and time  Mental status is at baseline  Psychiatric:         Mood and Affect: Mood normal          Behavior: Behavior normal          Vital Signs  ED Triage Vitals [12/30/21 1338]   Temperature Pulse Respirations Blood Pressure SpO2   98 6 °F (37 °C) 86 16 118/63 100 %      Temp Source Heart Rate Source Patient Position - Orthostatic VS BP Location FiO2 (%)   Oral Monitor -- -- --      Pain Score       5           Vitals:    12/30/21 1338   BP: 118/63   Pulse: 86         Visual Acuity      ED Medications  Medications - No data to display    Diagnostic Studies  Results Reviewed     Procedure Component Value Units Date/Time    COVID/FLU - 24 hour TAT [[id number]] Collected: 12/30/21 1454    Lab Status: In process Specimen: Nares from Nose Updated: 12/30/21 1458                 No orders to display              Procedures  Procedures         ED Course                                             MDM  Number of Diagnoses or Management Options  Acute viral syndrome: new and requires workup  Diagnosis management comments: This is a 50-year-old female presenting to the emergency department today for flu-like symptoms  Symptoms include cough, rhinorrhea, and diarrhea  Not COVID her influenza vaccinated  Vital signs are stable    Physical exam is within normal limits  Viral testing was performed  The patient is stable for discharge at this time  Quarantine instructions were given  Strict return precautions given  Recommended PCP follow-up as soon as possible  Recommending over-the-counter cough and cold medication in addition to over-the-counter Tylenol and ibuprofen for symptomatic relief  The patient verifies her understanding and agrees to the plan at this time  All questions answered to the patient's satisfaction  Amount and/or Complexity of Data Reviewed  Clinical lab tests: ordered    Patient Progress  Patient progress: stable      Disposition  Final diagnoses:   Acute viral syndrome     Time reflects when diagnosis was documented in both MDM as applicable and the Disposition within this note     Time User Action Codes Description Comment    12/30/2021  2:58 PM Usha Perla Add [B34 9] Acute viral syndrome       ED Disposition     ED Disposition Condition Date/Time Comment    Discharge Stable Thu Dec 30, 2021  2:58 PM [facility] discharge to home/self care              Follow-up Information     Follow up With Specialties Details Why Contact Info Additional [id number] Emergency Department Emergency Medicine Go to  If symptoms worsen [address] [facility],[address] [id number]  [address] [place] [address] Emergency Department, [address], [address] [facility] Medicine Schedule an appointment as soon as possible for a visit   [address] [address]  [id number]  , [place], [place], [address]          Discharge Medication List as of 12/30/2021  2:59 PM      CONTINUE these medications which have NOT CHANGED    Details   ketorolac (TORADOL) 10 mg tablet Take 1 tablet (10 mg total) by mouth daily, Starting Wed 11/10/2021, Normal methylPREDNISolone 4 MG tablet therapy pack Use as directed on package, Normal             No discharge procedures on file      PDMP Review       Value Time User    PDMP Reviewed  Yes 10/6/2021  3:55 PM Phoebe Valenzuela MD          ED Provider  Electronically Signed by           Aneesh Rasmussen PA-C  12/30/21 [phone]

## 2021-12-30 NOTE — DISCHARGE INSTRUCTIONS
You have been given a 24 hour COVID-19 and influenza test here in the emergency department; please remain quarantine at home until your results are received  This should be in longer than 24 hours  You may use Tylenol and ibuprofen for pain and fever relief  Please see the back of bottle for dosing instructions  Please return to the emergency department for worsening symptoms including chest pain, shortness of breath, dizziness, lightheadedness, fainting episodes, fever greater than 103, unremitting nausea, unilateral leg swelling, unremitting fever, etc  Please follow-up with your family practice provider at your earliest convenience

## 2021-12-30 NOTE — Clinical Note
Lamar Figueroa was seen and treated in our emergency department on 12/30/2021  Diagnosis:     Kyra Ash  is off the rest of the shift today  She may return on this date:     Please excuse this individual from a duties on December 30, 2021 December 31, 2021  She was being evaluated in the emergency department by me  Please call with questions or concerns  If you have any questions or concerns, please don't hesitate to call        Chandan Rivera PA-C    ______________________________           _______________          _______________  Hospital Representative                              Date                                Time

## 2022-01-05 LAB
FLUAV RNA RESP QL NAA+PROBE: NEGATIVE
FLUBV RNA RESP QL NAA+PROBE: NEGATIVE
SARS-COV-2 RNA RESP QL NAA+PROBE: POSITIVE

## 2022-01-12 ENCOUNTER — OFFICE VISIT (OUTPATIENT)
Dept: PODIATRY | Facility: CLINIC | Age: 42
End: 2022-01-12
Payer: MEDICARE

## 2022-01-12 VITALS
DIASTOLIC BLOOD PRESSURE: 70 MMHG | WEIGHT: 196.2 LBS | BODY MASS INDEX: 29.73 KG/M2 | HEIGHT: 68 IN | SYSTOLIC BLOOD PRESSURE: 130 MMHG | HEART RATE: 68 BPM

## 2022-01-12 DIAGNOSIS — S92.514A NONDISPLACED FRACTURE OF PROXIMAL PHALANX OF RIGHT LESSER TOE(S), INITIAL ENCOUNTER FOR CLOSED FRACTURE: Primary | ICD-10-CM

## 2022-01-12 PROCEDURE — 99214 OFFICE O/P EST MOD 30 MIN: CPT | Performed by: PODIATRIST

## 2022-01-12 NOTE — PROGRESS NOTES
This patient was seen on 1/12/22  My role is Foot , Ankle, and Wound Specialist    SUBJECTIVE    Chief Complaint:  Toe fracture     Patient ID: Sergei Trivedi is a 42 y o  female  Renleon Millere is here for her fractured 5th toe Right foot  She had tripped over her dog at home on Saturday and bumped the toe  She has been back at work and relates a lot of pain  She's wearing a surgical shoe and sporadically lewis taping the toe (none on today)  The following portions of the patient's history were reviewed and updated as appropriate: allergies, current medications, past family history, past medical history, past social history, past surgical history and problem list     Review of Systems   Constitutional: Negative  Respiratory: Negative  Cardiovascular: Negative  Gastrointestinal: Negative  Musculoskeletal: Positive for arthralgias, gait problem and joint swelling  OBJECTIVE      /70   Pulse 68   Ht 5' 8" (1 727 m) Comment: verbal  Wt 89 kg (196 lb 3 2 oz)   LMP 12/26/2021   BMI 29 83 kg/m²     Foot/Ankle Musculoskeletal Exam    General      Neurological: alert      General additional comments: The toe is mildly edematous  No deformity noted  It's very tender  Neurovascular      Neurovascular - Right        Dorsalis pedis: 2+      Posterior tibial: 2+      Neurovascular - Left        Dorsalis pedis: 2+      Posterior tibial: 2+       Physical Exam  Vitals and nursing note reviewed  Constitutional:       General: She is not in acute distress  Appearance: Normal appearance  She is not ill-appearing, toxic-appearing or diaphoretic  HENT:      Head: Normocephalic and atraumatic  Cardiovascular:      Rate and Rhythm: Normal rate  Pulses:           Dorsalis pedis pulses are 2+ on the right side and 2+ on the left side  Posterior tibial pulses are 2+ on the right side and 2+ on the left side     Pulmonary:      Effort: Pulmonary effort is normal    Musculoskeletal: General: Swelling and tenderness present  Comments: The toe is mildly edematous  No deformity noted  It's very tender  Feet:      Right foot:      Protective Sensation: 10 sites tested  10 sites sensed  Left foot:      Protective Sensation: 10 sites tested  10 sites sensed  Skin:     General: Skin is warm and dry  Neurological:      Mental Status: She is alert  ASSESSMENT     Diagnoses and all orders for this visit:    Nondisplaced fracture of proximal phalanx of right lesser toe(s), initial encounter for closed fracture  -     Cancel: X-ray toe right 2+ views; Future         Problem List Items Addressed This Visit        Musculoskeletal and Integument    Nondisplaced fracture of proximal phalanx of right lesser toe(s), initial encounter for closed fracture - Primary              PLAN  I ordered xrays of the foot and a wet read of the images reveals that alignment is maintained  I don't see significant healing yet  I recommended CONSISTENT lewis splinting  I recommended a full length camboot which would offload better and give some relief but she refused it  I'll give her another week off from work  I recommended all ambulation be in the surgical shoe and she'll see me back for repeat xrays in 3 weeks

## 2022-04-05 ENCOUNTER — OFFICE VISIT (OUTPATIENT)
Dept: OBGYN CLINIC | Facility: CLINIC | Age: 42
End: 2022-04-05
Payer: MEDICARE

## 2022-04-05 VITALS
SYSTOLIC BLOOD PRESSURE: 118 MMHG | HEIGHT: 68 IN | WEIGHT: 201.2 LBS | BODY MASS INDEX: 30.49 KG/M2 | DIASTOLIC BLOOD PRESSURE: 70 MMHG

## 2022-04-05 DIAGNOSIS — Z12.31 ENCOUNTER FOR SCREENING MAMMOGRAM FOR MALIGNANT NEOPLASM OF BREAST: ICD-10-CM

## 2022-04-05 DIAGNOSIS — N92.0 MENORRHAGIA WITH REGULAR CYCLE: ICD-10-CM

## 2022-04-05 DIAGNOSIS — Z01.419 ENCOUNTER FOR GYNECOLOGICAL EXAMINATION WITHOUT ABNORMAL FINDING: Primary | ICD-10-CM

## 2022-04-05 DIAGNOSIS — N94.6 DYSMENORRHEA: ICD-10-CM

## 2022-04-05 DIAGNOSIS — R11.2 NAUSEA AND VOMITING, UNSPECIFIED VOMITING TYPE: ICD-10-CM

## 2022-04-05 DIAGNOSIS — Z12.4 ENCOUNTER FOR PAPANICOLAOU SMEAR FOR CERVICAL CANCER SCREENING: ICD-10-CM

## 2022-04-05 DIAGNOSIS — F17.209 TOBACCO USE DISORDER, CONTINUOUS: ICD-10-CM

## 2022-04-05 DIAGNOSIS — Z11.3 SCREENING FOR STDS (SEXUALLY TRANSMITTED DISEASES): ICD-10-CM

## 2022-04-05 PROCEDURE — 87591 N.GONORRHOEAE DNA AMP PROB: CPT | Performed by: PHYSICIAN ASSISTANT

## 2022-04-05 PROCEDURE — 87491 CHLMYD TRACH DNA AMP PROBE: CPT | Performed by: PHYSICIAN ASSISTANT

## 2022-04-05 PROCEDURE — G0145 SCR C/V CYTO,THINLAYER,RESCR: HCPCS | Performed by: PHYSICIAN ASSISTANT

## 2022-04-05 PROCEDURE — 99396 PREV VISIT EST AGE 40-64: CPT | Performed by: PHYSICIAN ASSISTANT

## 2022-04-05 PROCEDURE — G0476 HPV COMBO ASSAY CA SCREEN: HCPCS | Performed by: PHYSICIAN ASSISTANT

## 2022-04-05 RX ORDER — ONDANSETRON 4 MG/1
4 TABLET, ORALLY DISINTEGRATING ORAL EVERY 8 HOURS PRN
Qty: 30 TABLET | Refills: 1 | Status: SHIPPED | OUTPATIENT
Start: 2022-04-05

## 2022-04-05 RX ORDER — NAPROXEN 500 MG/1
500 TABLET ORAL 2 TIMES DAILY PRN
Qty: 30 TABLET | Refills: 1 | Status: SHIPPED | OUTPATIENT
Start: 2022-04-05

## 2022-04-05 NOTE — PROGRESS NOTES
ASSESSMENT & PLAN: Leta Faulkner is a 36 y o  [id number] with normal gynecologic exam     1   Routine well woman exam done today  2  Pap and HPV:  The patient's last pap and hpv was approx 2019 per pt  It was abnormal   Pt notes hx of colposcopy  Pap and cotesting was done today  Current ASCCP Guidelines reviewed  3   Mammogram ordered - 3D  4  The following were reviewed in today's visit: mammography screening ordered, STD testing ordered, exercise, healthy diet and tobacco cessation (pt pursuing on her own)  5  Discussed N/V at onset of menses, her heavy/painful periods at length  Start workup with pelvic US, and due to age and change in menses past 6 months, may also need to consider EBx - pt aware of this and agreeable if necessary  Will be discussed with Dr Sophie Brunner once US results back  Pt agrees to schedule US ASAP and will f/u with her after  Pt had BW from PCP 10/2021 including Hgb and TSH which were WNL  For pelvic pain control, can take naproxen as prescribed and for N/V first few days, zofran 4mg sublingual prescribed  RTO after US completed  CC:  Annual Gynecologic Examination    HPI: Leta Faulkner is a 36 y o  Matt Bleacher who presents for annual gynecologic examination  She has the following concerns:  States periods as she is getting older had been heavier and nausea/vomiting with them first few days  Pt has hx of depression and intentional overdose 2016  Pt states she has been doing very well with no MH issues  States therapist does reach out to her from time to time but not receiving any formal tx  States was in a bad place and bad environment and has been doing well since removing herself from negativity  Healthy diet Yes; eats out once a week  Drinks about 60-80oz water/day  Vitamins No  Exercise No - walks a lot at work  No formal exercise  Tobacco - about 6 cigs/day, has cut back  Patient's last menstrual period was 03/18/2022      Menses frequency: regular, every 28-30 days   Length of bleedin days  Bleeding quality: heavy all 7 days, states changing pad 5-6 x a day  Also clots  Pain with menses: x 4 days  Ibuprofen doesn't help  Associated nausea, gagging and vomiting the first 3 days of her period  States cannot go to work during that time b/c of sxs  N/V not secondary to pain  She does take ibuprofen for pain which doesn't help  Has to take off work first 2-3 days b/c of sxs every month  Sxs have happened approx  past 6 cycles/months  Last Mammogram:   Last Colon Cancer Screening: never done      Health Maintenance:    She wears her seatbelt routinely  She does perform regular monthly self breast exams  No breast concerns today  Denies breast pains, lumps, skin changes or nipple discharge  She feels safe at home  Feels safe in relationship with partner       Patient Active Problem List   Diagnosis    Major depressive disorder with current active episode    Anxiety    [facility]stance abuse [facility]    Tobacco use disorder, continuous    Nondisplaced fracture of proximal phalanx of right lesser toe(s), initial encounter for closed fracture       Past Medical History:   Diagnosis Date    [facility]     Anxiety     Atypical squamous cell of undetermined significance of cervix 2019    [facility]     Chlamydia     Depression     Drug overdose 2016    Gonorrhea     History of mammogram 2019    Normal     HPV (human papilloma virus) infection 2019    Miscarriage     Papanicolaou smear for cervical cancer screening 08/15/2019    ASCUS; HPV pos      Psychiatric illness     Suicidal ideation 2016    Suicide attempt [facility])        Past Surgical History:   Procedure Laterality Date    COLPOSCOPY      x2 while pregnant     WISDOM TOOTH EXTRACTION         Past OB/Gyn History:  OB History        6    Para        Term                AB   3    Living   3 SAB   1    IAB   2    Ectopic   0    Multiple   0    Live Births   3           Obstetric Comments   Age at menarche: 14  Age at first child: 22  Abnormal Pap: (had 2 colpos in the past, ASCUS 2019)   3  - last 2 pregnancies were premature - PROM               Pt has menstrual issues  As above  History of sexually transmitted infection: Yes  many years ago  History of abnormal pap smears: Yes, approx 2019 w/  colpo per pt  Patient is  currently sexually active with female partner, monogamous  Pt also has tubal ligation hx       Family History   Problem Relation Age of Onset    Suicidality Mother     Depression Mother     No Known Problems Father        Family History of Breast/ Uterine/Ovarian/Colon Cancer:  Denies all     Social History:  Social History     Socioeconomic History    Marital status: Single     Spouse name: Not on file    Number of children: Not on file    Years of education: 15    Highest education level: Not on file   Occupational History    Not on file   Tobacco Use    Smoking status: Current Every Day Smoker     Packs/day: 1 00     Years: 15 00     Pack years: 15 00    Smokeless tobacco: Never Used   Vaping Use    Vaping Use: Never used   Substance and Sexual Activity    Alcohol use: Yes     Comment: Drinks 1-2 drinks 1-2 X yearly    Drug use: Not Currently     Types: Cocaine, Marijuana     Comment: Denies illicit drugs - As per [facility] Sexual activity: Yes     Partners: Male     Birth control/protection: None   Other Topics Concern    Not on file   Social History Narrative    Not on file     Social Determinants of Health     Financial Resource Strain: Not on file   Food Insecurity: Not on file   Transportation Needs: Not on file   Physical Activity: Not on file   Stress: Not on file   Social Connections: Not on file   Intimate Partner Violence: Not on file   Housing Stability: Not on file     No Known Allergies    Current Outpatient Medications:     naproxen (Naprosyn) 500 mg tablet, Take 1 tablet (500 mg total) by mouth 2 (two) times a day as needed (pelvic pain/cramping with menses), Disp: 30 tablet, Rfl: 1    ondansetron (Zofran ODT) 4 mg disintegrating tablet, Take 1 tablet (4 mg total) by mouth every 8 (eight) hours as needed for nausea or vomiting (related to menses), Disp: 30 tablet, Rfl: 1    Review of Systems:    Review of Systems  Constitutional :no fever, feels well, no tiredness, no recent weight gain or loss  Cardiovascular: no complaints of slow or fast heart beat, no chest pain, no palpitations, no leg claudication or lower extremity edema  Respiratory: no complaints of shortness of shortness of breath, no MONIQUE  Breasts:no complaints of breast pain, breast lump, or nipple discharge  Gastrointestinal: C/O nausea and vomiting with first 2-3 days of menses; no complaints of abdominal pain, constipation or diarrhea or bloody stools  Genitourinary : AS IN HPI   no complaints of dysuria, incontinence,  vaginal discharge or abnormal vaginal bleeding  Musculoskeletal: no complaints of arthralgia, no myalgia, no joint swelling or stiffness, no limb pain or swelling  Integumentary: no complaints of skin rash or lesion, itching or dry skin  Neurological: no complaints of headache, no confusion, no numbness or tingling, no dizziness or fainting  MH: pt denies anxiety, depression sxs or SI/HI  Objective      /70 (BP Location: Left arm, Patient Position: Sitting, Cuff Size: Standard)   Ht 5' 8" (1 727 m)   Wt 91 3 kg (201 lb 3 2 oz)   LMP 03/18/2022   BMI 30 59 kg/m²     General:   appears stated age, cooperative, alert normal mood and affect   Neck: normal, supple,trachea midline, no masses   Thyroid palpated normal     Heart: regular rate and rhythm, S1, S2 normal, no murmur, click, rub or gallop   Lungs: clear to auscultation bilaterally   Breasts: normal appearance, no masses or tenderness, No nipple retraction or dimpling, No nipple discharge or bleeding, No axillary or supraclavicular adenopathy, Normal to palpation without dominant masses   Abdomen: soft, non-tender, without masses or organomegaly   Vulva: normal female genitalia, no lesions   Vagina: normal vagina, no discharge, exudate, lesion, or erythema   Urethra: normal   Cervix: Normal, no discharge  PAP done  Nontender  Uterus: normal   Adnexa: no mass, fullness, tenderness   Lymphatic palpation of lymph nodes in neck, axilla, groin and/or other locations: no lymphadenopathy or masses noted   Skin normal skin turgor and no rashes     Psychiatric orientation to person, place, and time: normal  mood and affect: normal

## 2022-04-06 LAB
C TRACH DNA SPEC QL NAA+PROBE: NEGATIVE
HPV HR 12 DNA CVX QL NAA+PROBE: NEGATIVE
HPV16 DNA CVX QL NAA+PROBE: NEGATIVE
HPV18 DNA CVX QL NAA+PROBE: NEGATIVE
N GONORRHOEA DNA SPEC QL NAA+PROBE: NEGATIVE

## 2022-04-12 ENCOUNTER — HOSPITAL ENCOUNTER (OUTPATIENT)
Dept: ULTRASOUND IMAGING | Facility: HOSPITAL | Age: 42
Discharge: HOME | End: 2022-04-12
Payer: MEDICARE

## 2022-04-12 DIAGNOSIS — N92.0 MENORRHAGIA WITH REGULAR CYCLE: ICD-10-CM

## 2022-04-12 DIAGNOSIS — N94.6 DYSMENORRHEA: ICD-10-CM

## 2022-04-12 PROCEDURE — 76856 US EXAM PELVIC COMPLETE: CPT

## 2022-04-12 PROCEDURE — 76830 TRANSVAGINAL US NON-OB: CPT

## 2022-04-13 LAB
LAB AP GYN PRIMARY INTERPRETATION: NORMAL
Lab: NORMAL

## 2022-06-25 ENCOUNTER — HOSPITAL ENCOUNTER (OUTPATIENT)
Dept: MAMMOGRAPHY | Facility: HOSPITAL | Age: 42
Discharge: HOME | End: 2022-06-25
Payer: MEDICARE

## 2022-06-25 DIAGNOSIS — Z12.31 ENCOUNTER FOR SCREENING MAMMOGRAM FOR MALIGNANT NEOPLASM OF BREAST: ICD-10-CM

## 2022-06-25 PROCEDURE — 77063 BREAST TOMOSYNTHESIS BI: CPT

## 2022-06-25 PROCEDURE — 77067 SCR MAMMO BI INCL CAD: CPT

## 2023-06-20 ENCOUNTER — HOSPITAL ENCOUNTER (EMERGENCY)
Facility: HOSPITAL | Age: 43
Discharge: HOME | End: 2023-06-20
Attending: EMERGENCY MEDICINE
Payer: OTHER MISCELLANEOUS

## 2023-06-20 VITALS
WEIGHT: 204 LBS | BODY MASS INDEX: 30.92 KG/M2 | HEIGHT: 68 IN | RESPIRATION RATE: 16 BRPM | HEART RATE: 80 BPM | TEMPERATURE: 98.1 F | OXYGEN SATURATION: 100 % | DIASTOLIC BLOOD PRESSURE: 78 MMHG | SYSTOLIC BLOOD PRESSURE: 127 MMHG

## 2023-06-20 DIAGNOSIS — M79.662 PAIN OF LEFT CALF: Primary | ICD-10-CM

## 2023-06-20 LAB — D DIMER PPP FEU-MCNC: 0.7 UG/ML FEU

## 2023-06-20 PROCEDURE — 96372 THER/PROPH/DIAG INJ SC/IM: CPT

## 2023-06-20 PROCEDURE — 36415 COLL VENOUS BLD VENIPUNCTURE: CPT | Performed by: EMERGENCY MEDICINE

## 2023-06-20 PROCEDURE — 85379 FIBRIN DEGRADATION QUANT: CPT | Performed by: EMERGENCY MEDICINE

## 2023-06-20 PROCEDURE — 99283 EMERGENCY DEPT VISIT LOW MDM: CPT

## 2023-06-20 PROCEDURE — 99284 EMERGENCY DEPT VISIT MOD MDM: CPT | Performed by: EMERGENCY MEDICINE

## 2023-06-20 RX ORDER — ENOXAPARIN SODIUM 100 MG/ML
1 INJECTION SUBCUTANEOUS ONCE
Status: COMPLETED | OUTPATIENT
Start: 2023-06-20 | End: 2023-06-20

## 2023-06-20 RX ADMIN — DICLOFENAC SODIUM 2 G: 10 GEL TOPICAL at 23:52

## 2023-06-20 RX ADMIN — ENOXAPARIN SODIUM 90 MG: 100 INJECTION SUBCUTANEOUS at 23:53

## 2023-06-21 ENCOUNTER — HOSPITAL ENCOUNTER (OUTPATIENT)
Dept: VASCULAR ULTRASOUND | Facility: HOSPITAL | Age: 43
Discharge: HOME | End: 2023-06-21
Attending: EMERGENCY MEDICINE
Payer: MEDICARE

## 2023-06-21 DIAGNOSIS — M79.662 PAIN OF LEFT CALF: ICD-10-CM

## 2023-06-21 PROCEDURE — 93971 EXTREMITY STUDY: CPT

## 2023-06-21 NOTE — ED PROVIDER NOTES
History  Chief Complaint   Patient presents with   • Leg Pain     Pt reports pulling  her left calf last week  Reports its swollen, throbbing, pt took tylenol with no relief  No meds pta  78-year-old female history of bipolar, chlamydia, depression, gonorrhea, HPV, anxiety, suicide attempt, psychiatric illness, miscarriage  Patient presents the emergency department with left calf pain  Present for approximately 1 week  Patient was working  She felt a pop in her left calf  Since then she has noted worsening swelling and pain in her left calf  Notes paresthesias in her left leg  Patient states she is not pregnant  She is  to a female and does not need to be tested for pregnancy  Review of records reveals a normal renal function on her most recent labs, though this was in December last year  Leg Pain  Location:  Leg  Leg location:  L leg  Pain details:     Quality:  Aching    Radiates to:  Does not radiate    Severity:  Severe    Onset quality:  Gradual    Timing:  Constant    Progression:  Worsening  Chronicity:  New      Prior to Admission Medications   Prescriptions Last Dose Informant Patient Reported?  Taking?   naproxen (Naprosyn) 500 mg tablet   No No   Sig: Take 1 tablet (500 mg total) by mouth 2 (two) times a day as needed (pelvic pain/cramping with menses)   ondansetron (Zofran ODT) 4 mg disintegrating tablet   No No   Sig: Take 1 tablet (4 mg total) by mouth every 8 (eight) hours as needed for nausea or vomiting (related to menses)      Facility-Administered Medications: None       Past Medical History:   Diagnosis Date   • Alcoholic intoxication without complication ([facility] ) 30/91/3023   • Anxiety    • Atypical squamous cell of undetermined significance of cervix 08/23/2019   • Bipolar disorder ([facility] )    • Chlamydia    • Depression    • Drug overdose 8/27/2016   • Gonorrhea    • History of mammogram 08/30/2019    Normal    • HPV (human papilloma virus) infection 08/23/2019   • Miscarriage    • Papanicolaou smear for cervical cancer screening 08/15/2019    ASCUS; HPV pos 2019    • Psychiatric illness    • Suicidal ideation 8/27/2016   • Suicide attempt [facility])        Past Surgical History:   Procedure Laterality Date   • COLPOSCOPY      x2 while pregnant    • WISDOM TOOTH EXTRACTION         Family History   Problem Relation Age of Onset   • Suicidality Mother    • Depression Mother    • No Known Problems Father    • No Known Problems Daughter    • No Known Problems Maternal Grandmother    • No Known Problems Maternal Grandfather    • No Known Problems Paternal Grandmother    • No Known Problems Paternal Grandfather    • No Known Problems Son    • No Known Problems Son      I have reviewed and agree with the history as documented  E-Cigarette/Vaping   • E-Cigarette Use Never User      E-Cigarette/Vaping Substances   • Nicotine No    • THC Yes    • CBD No    • Flavoring No    • Other No    • Unknown No      Social History     Tobacco Use   • Smoking status: Every Day     Packs/day: 1 00     Years: 15 00     Total pack years: 15 00     Types: Cigarettes   • Smokeless tobacco: Never   Vaping Use   • Vaping Use: Never used   Substance Use Topics   • Alcohol use: Yes     Comment: Drinks 1-2 drinks 1-2 X yearly   • Drug use: Not Currently     Types: Cocaine, Marijuana     Comment: Denies illicit drugs - As per [facility]        Review of Systems   Musculoskeletal: Positive for myalgias  All other systems reviewed and are negative  Physical Exam  Physical Exam  Vitals and nursing note reviewed  Constitutional:       General: She is not in acute distress  Appearance: Normal appearance  She is not ill-appearing  HENT:      Head: Normocephalic and atraumatic  Right Ear: External ear normal       Left Ear: External ear normal       Nose: Nose normal       Mouth/Throat:      Mouth: Mucous membranes are moist    Eyes:      General:         Right eye: No discharge           Left eye: No discharge  Conjunctiva/sclera: Conjunctivae normal    Cardiovascular:      Rate and Rhythm: Normal rate and regular rhythm  Pulses: Normal pulses  Heart sounds: No murmur heard  Comments: Normal DP pulse  Pulmonary:      Effort: Pulmonary effort is normal       Breath sounds: Normal breath sounds  Abdominal:      General: Abdomen is flat  There is no distension  Tenderness: There is no abdominal tenderness  Musculoskeletal:         General: Tenderness and deformity present  Normal range of motion  Cervical back: Normal range of motion  Comments: Swelling of the left calf  Pain on palpation of this area  Patient is able to plantarflex and dorsiflex her left foot  Skin:     General: Skin is warm  Capillary Refill: Capillary refill takes less than 2 seconds  Findings: No rash  Neurological:      General: No focal deficit present  Mental Status: She is alert  Mental status is at baseline  Comments: Sensation intact to light touch in LLE      Psychiatric:         Mood and Affect: Mood normal          Behavior: Behavior normal          Vital Signs  ED Triage Vitals [06/20/23 2124]   Temperature Pulse Respirations Blood Pressure SpO2   98 1 °F (36 7 °C) 80 16 127/78 100 %      Temp Source Heart Rate Source Patient Position - Orthostatic VS BP Location FiO2 (%)   Oral Monitor Lying Left arm --      Pain Score       --           Vitals:    06/20/23 2124   BP: 127/78   Pulse: 80   Patient Position - Orthostatic VS: Lying         Visual Acuity      ED Medications  Medications   Diclofenac Sodium (VOLTAREN) 1 % topical gel 2 g (has no administration in time range)   enoxaparin (LOVENOX) subcutaneous injection 90 mg (has no administration in time range)       Diagnostic Studies  Results Reviewed     Procedure Component Value Units Date/Time    D-dimer, quantitative [[id number]]  (Abnormal) Collected: 06/20/23 2236    Lab Status: Final result Specimen: Blood from Arm, Right Updated: 06/20/23 7604     D-Dimer, Quant 0 70 ug/ml FEU                  VAS lower limb venous duplex study, unilateral/limited    (Results Pending)              Procedures  Procedures         ED Course                                             Medical Decision Making  The patient presenting with left calf pain and swelling  Likely muscular in nature but also could be DVT  Will evaluate for we will give dosage of Lovenox  Most recent renal function normal   No reason to suspect PHILIPP or renal function abnormality  Furl sent to duplex for emergent ultrasound tomorrow  if negative for DVT will recommend follow-up with orthopedic surgery  Pain of left calf: acute illness or injury  Amount and/or Complexity of Data Reviewed  Labs: ordered  Risk  Prescription drug management  Disposition  Final diagnoses:   Pain of left calf     Time reflects when diagnosis was documented in both MDM as applicable and the Disposition within this note     Time User Action Codes Description Comment    6/20/2023 11:23 PM Rosina Blocker Add [Y26 [id number]] Pain of left calf       ED Disposition     ED Disposition   Discharge    Condition   Stable    Date/Time   Tue Jun 20, 2023 11:24 PM    Comment   Dionna Galloway discharge to home/self care                 Follow-up Information     Follow up With Specialties Details Why Contact Info Additional Information    Vascular  Call  tomorrow at [facility] Orthopedic Surgery Schedule an appointment as soon as possible for a visit  if no  [address] [id number] [facility] [address] [facility], [address] [place] [address] [place] [phone]    [address] Emergency Department Emergency Medicine  If symptoms worsen [address] [id number]  [address] [place] [address] Emergency Department, [address] [place] [address]  [place], [address] [facility] [address]          Patient's Medications   Discharge Prescriptions    DICLOFENAC SODIUM (VOLTAREN) 1 %    Apply 2 g topically 4 (four) times a day       Start Date: 6/20/2023 End Date: --       Order Dose: 2 g       Quantity: 100 g    Refills: 0       Outpatient Discharge Orders   VAS lower limb venous duplex study, unilateral/limited   Standing Status: Future Standing Exp   Date: 06/20/27       PDMP Review       Value Time User    PDMP Reviewed  Yes 10/6/2021  3:55 PM Boo Galindo MD          ED Provider  Electronically Signed by           Cleatrice Collet, DO  06/20/23 7045

## 2023-06-21 NOTE — DISCHARGE INSTRUCTIONS
Come back tomorrow for ultrasound of your leg to rule out DVT  Call  tomorrow morning at 8 AM     If the study is negative follow-up with orthopedic surgery  If it is positive they will send you back to the emergency department for anticoagulation

## 2023-06-22 PROCEDURE — 93971 EXTREMITY STUDY: CPT | Performed by: SURGERY

## 2023-06-26 ENCOUNTER — APPOINTMENT (OUTPATIENT)
Dept: RADIOLOGY | Facility: AMBULARY SURGERY CENTER | Age: 43
End: 2023-06-26
Attending: STUDENT IN AN ORGANIZED HEALTH CARE EDUCATION/TRAINING PROGRAM
Payer: MEDICARE

## 2023-06-26 ENCOUNTER — OFFICE VISIT (OUTPATIENT)
Dept: OBGYN CLINIC | Facility: CLINIC | Age: 43
End: 2023-06-26
Payer: MEDICARE

## 2023-06-26 VITALS
DIASTOLIC BLOOD PRESSURE: 87 MMHG | HEART RATE: 87 BPM | WEIGHT: 204 LBS | SYSTOLIC BLOOD PRESSURE: 127 MMHG | BODY MASS INDEX: 30.92 KG/M2 | HEIGHT: 68 IN

## 2023-06-26 DIAGNOSIS — S86.112A GASTROCNEMIUS TEAR, LEFT, INITIAL ENCOUNTER: ICD-10-CM

## 2023-06-26 DIAGNOSIS — M25.562 LEFT KNEE PAIN, UNSPECIFIED CHRONICITY: ICD-10-CM

## 2023-06-26 DIAGNOSIS — M25.562 LEFT KNEE PAIN, UNSPECIFIED CHRONICITY: Primary | ICD-10-CM

## 2023-06-26 PROCEDURE — 73562 X-RAY EXAM OF KNEE 3: CPT

## 2023-06-26 PROCEDURE — 99204 OFFICE O/P NEW MOD 45 MIN: CPT | Performed by: STUDENT IN AN ORGANIZED HEALTH CARE EDUCATION/TRAINING PROGRAM

## 2023-06-26 RX ORDER — MELOXICAM 15 MG/1
15 TABLET ORAL DAILY
Qty: 30 TABLET | Refills: 2 | Status: SHIPPED | OUTPATIENT
Start: 2023-06-26 | End: 2023-06-30

## 2023-06-26 NOTE — LETTER
June 26, 2023     Patient: Celeste Wade  YOB: 1980  Date of Visit: 6/26/2023      To Whom it May Concern:    Celeste Wade is under my professional care  Kylee Lillys was seen in my office on 6/26/2023  Kyleecachorro Mckeon is unable to return back to work until cleared by my office  If you have any questions or concerns, please don't hesitate to call           Sincerely,          Jag Waller DO        CC: No Recipients

## 2023-06-26 NOTE — PROGRESS NOTES
Orthopaedics Office Visit - New  Patient Visit    ASSESSMENT/PLAN:    Assessment:   #1 left gastrocnemius muscle belly tearing    Plan:   #1 patient with signs and symptoms consistent with a left gastrocnemius muscle tear  Achilles intact  Conservative treatments discussed with the patient including temporary use of heel lifts, anti-inflammatory medications, ice, rest, physical therapy  2  A prescription for meloxicam 15 mg p o  once daily was prescribed to the patient  Risk medications discussed  3  Patient can continue to be weightbearing as tolerated activity as tolerated to the leftt lower extremity  4  A note to excuse the patient from work for 1 week was given to allow for rest as the patient's symptoms are now allowing her to perform her duties of work  5  Patient can follow-up on an as-needed basis    To Do Next Visit:  As needed    _____________________________________________________  CHIEF COMPLAINT:  Chief Complaint   Patient presents with   • Left Knee - Pain, Swelling         SUBJECTIVE:  Daquan Garcia is a 39 y o  female who presents today consultation for left lower leg pain  She was referred by Dr Pietro Seip  Patient was seen at the ED on 6/20/23 due to having left calf pain  She states states on 6/20/23 she was kneeling cleaning a bathtub at work and went to stand up and felt a two popping behind her left knee and calf pain  At the ED she had a venus duplex on and showed evidence of calf tear and prescribed Diclofenac gel  She states she is having pain medial left calf and left knee swelling to the ankle  She notes left knee cracking but with no pain  Her pain is worse with weightbearing  She has been icing, taking Tylenol and using Voltaren gel with no relief  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  She denies numbness or paresthesias       PAST MEDICAL HISTORY:  Past Medical History:   Diagnosis Date   • Alcoholic intoxication without complication ([facility] ) 11/24/2016   • Anxiety    • Atypical squamous cell of undetermined significance of cervix 08/23/2019   • Bipolar disorder ([facility] )    • Chlamydia    • Depression    • Drug overdose 8/27/2016   • Gonorrhea    • History of mammogram 08/30/2019    Normal    • HPV (human papilloma virus) infection 08/23/2019   • Miscarriage    • Papanicolaou smear for cervical cancer screening 08/15/2019    ASCUS; HPV pos 2019    • Psychiatric illness    • Suicidal ideation 8/27/2016   • Suicide attempt ([facility] )        PAST SURGICAL HISTORY:  Past Surgical History:   Procedure Laterality Date   • COLPOSCOPY      x2 while pregnant    • WISDOM TOOTH EXTRACTION         FAMILY HISTORY:  Family History   Problem Relation Age of Onset   • Suicidality Mother    • Depression Mother    • No Known Problems Father    • No Known Problems Daughter    • No Known Problems Maternal Grandmother    • No Known Problems Maternal Grandfather    • No Known Problems Paternal Grandmother    • No Known Problems Paternal Grandfather    • No Known Problems Son    • No Known Problems Son        SOCIAL HISTORY:  Social History     Tobacco Use   • Smoking status: Every Day     Packs/day: 1 00     Years: 15 00     Total pack years: 15 00     Types: Cigarettes   • Smokeless tobacco: Never   Vaping Use   • Vaping Use: Never used   Substance Use Topics   • Alcohol use: Yes     Comment: Drinks 1-2 drinks 1-2 X yearly   • Drug use: Not Currently     Types: Cocaine, Marijuana     Comment: Denies illicit drugs - As per [place]        MEDICATIONS:    Current Outpatient Medications:   •  Diclofenac Sodium (VOLTAREN) 1 %, Apply 2 g topically 4 (four) times a day, Disp: 100 g, Rfl: 0  •  ondansetron (Zofran ODT) 4 mg disintegrating tablet, Take 1 tablet (4 mg total) by mouth every 8 (eight) hours as needed for nausea or vomiting (related to menses), Disp: 30 tablet, Rfl: 1  •  naproxen (Naprosyn) 500 mg tablet, Take 1 tablet (500 mg total) by mouth 2 (two) times a day as needed "(pelvic pain/cramping with menses) (Patient not taking: Reported on 6/26/2023), Disp: 30 tablet, Rfl: 1    ALLERGIES:  No Known Allergies    REVIEW OF SYSTEMS:  MSK: Left leg   Neuro: None   Pertinent items are otherwise noted in HPI  A comprehensive review of systems was otherwise negative  LABS:  HgA1c:   Lab Results   Component Value Date    HGBA1C 5 5 12/09/2022     BMP:   Lab Results   Component Value Date    CALCIUM 9 1 10/13/2021    K 3 7 10/13/2021    CO2 26 10/13/2021     10/13/2021    BUN 7 10/13/2021    CREATININE 0 75 10/13/2021     CBC: No components found for: \"CBC\"    _____________________________________________________  PHYSICAL EXAMINATION:  Vital signs: /87 (BP Location: Left arm, Patient Position: Sitting, Cuff Size: Standard)   Pulse 87   Ht 5' 8\" (1 727 m)   Wt 92 5 kg (204 lb)   LMP 06/17/2023 (Exact Date)   BMI 31 02 kg/m²   General: No acute distress, awake and alert  Psychiatric: Mood and affect appear appropriate  HEENT: Trachea Midline, No torticollis, no apparent facial trauma  Cardiovascular: No audible murmurs; Extremities appear perfused  Pulmonary: No audible wheezing or stridor  Skin: No open lesions; see further details (if any) below    MUSCULOSKELETAL EXAMINATION:  Extremities:\  The left lower extremity was exposed inspected  The skin overlying the left lower extremity is intact without any superficial abrasions or lacerations  The patient has fullness in the left calf when compared to the contralateral side  She has tenderness to palpation over the proximal aspect of the gastrocnemius muscles both medially and laterally  The Achilles tendon is nontender and is intact no palpable defect  Patient is able to actively plantarflex the lower extremity  She has tenderness in the posterior aspect of her calf with forced plantarflexion as well as passive dorsiflexion  The patient's knee is nonpainful throughout range of motion  No knee effusion    The " patient's sensation is intact to light touch in the superficial peroneal, deep peroneal, sural, saphenous, plantar nerve distributions  Tibialis anterior, extensor hallux longus, gastrocnemius muscles intact  Limb is well-perfused  Compartment soft compressible      _____________________________________________________  STUDIES REVIEWED:  I personally reviewed the images and interpretation is as follows:  X-rays of the left knee demonstrate no acute fractures or dislocations  No degenerative changes    No bony avulsions    PROCEDURES PERFORMED:  Procedures    Scribe Attestation    I,:  Nika Rodriguez am acting as a scribe while in the presence of the attending physician :       I,:  Pepe Valiente DO personally performed the services described in this documentation    as scribed in my presence :

## 2023-06-27 ENCOUNTER — APPOINTMENT (EMERGENCY)
Dept: ULTRASOUND IMAGING | Facility: HOSPITAL | Age: 43
DRG: 282 | End: 2023-06-27
Payer: MEDICARE

## 2023-06-27 ENCOUNTER — HOSPITAL ENCOUNTER (INPATIENT)
Facility: HOSPITAL | Age: 43
LOS: 1 days | Discharge: HOME | DRG: 282 | End: 2023-06-30
Attending: EMERGENCY MEDICINE | Admitting: STUDENT IN AN ORGANIZED HEALTH CARE EDUCATION/TRAINING PROGRAM
Payer: MEDICARE

## 2023-06-27 ENCOUNTER — APPOINTMENT (OUTPATIENT)
Dept: CT IMAGING | Facility: HOSPITAL | Age: 43
DRG: 282 | End: 2023-06-27
Payer: MEDICARE

## 2023-06-27 DIAGNOSIS — K85.20 ALCOHOL-INDUCED ACUTE PANCREATITIS, UNSPECIFIED COMPLICATION STATUS: ICD-10-CM

## 2023-06-27 DIAGNOSIS — K85.90 PANCREATITIS: Primary | ICD-10-CM

## 2023-06-27 PROBLEM — M79.605 LEFT LEG PAIN: Status: ACTIVE | Noted: 2023-06-27

## 2023-06-27 PROBLEM — M25.562 LEFT KNEE PAIN: Status: ACTIVE | Noted: 2023-06-27

## 2023-06-27 PROBLEM — R10.9 ABDOMINAL PAIN: Status: ACTIVE | Noted: 2023-06-27

## 2023-06-27 LAB
ALBUMIN SERPL BCP-MCNC: 3.8 G/DL (ref 3.5–5)
ALP SERPL-CCNC: 80 U/L (ref 34–104)
ALT SERPL W P-5'-P-CCNC: 31 U/L (ref 7–52)
ANION GAP SERPL CALCULATED.3IONS-SCNC: 9 MMOL/L
AST SERPL W P-5'-P-CCNC: 40 U/L (ref 13–39)
BASOPHILS # BLD MANUAL: 0 THOUSAND/UL (ref 0–0.1)
BASOPHILS NFR MAR MANUAL: 0 % (ref 0–1)
BILIRUB DIRECT SERPL-MCNC: 0.2 MG/DL (ref 0–0.2)
BILIRUB SERPL-MCNC: 1.09 MG/DL (ref 0.2–1)
BUN SERPL-MCNC: 4 MG/DL (ref 5–25)
CALCIUM SERPL-MCNC: 9.2 MG/DL (ref 8.4–10.2)
CHLORIDE SERPL-SCNC: 101 MMOL/L (ref 96–108)
CO2 SERPL-SCNC: 25 MMOL/L (ref 21–32)
CREAT SERPL-MCNC: 0.64 MG/DL (ref 0.6–1.3)
EOSINOPHIL # BLD MANUAL: 0.27 THOUSAND/UL (ref 0–0.4)
EOSINOPHIL NFR BLD MANUAL: 4 % (ref 0–6)
ERYTHROCYTE [DISTWIDTH] IN BLOOD BY AUTOMATED COUNT: 14.8 % (ref 11.6–15.1)
GFR SERPL CREATININE-BSD FRML MDRD: 108 ML/MIN/1.73SQ M
GIANT PLATELETS BLD QL SMEAR: PRESENT
GLUCOSE SERPL-MCNC: 100 MG/DL (ref 65–140)
HCG SERPL QL: NEGATIVE
HCT VFR BLD AUTO: 44 % (ref 34.8–46.1)
HGB BLD-MCNC: 14.6 G/DL (ref 11.5–15.4)
LIPASE SERPL-CCNC: 182 U/L (ref 11–82)
LYMPHOCYTES # BLD AUTO: 2.53 THOUSAND/UL (ref 0.6–4.47)
LYMPHOCYTES # BLD AUTO: 37 % (ref 14–44)
MCH RBC QN AUTO: 29 PG (ref 26.8–34.3)
MCHC RBC AUTO-ENTMCNC: 33.2 G/DL (ref 31.4–37.4)
MCV RBC AUTO: 87 FL (ref 82–98)
MONOCYTES # BLD AUTO: 0.27 THOUSAND/UL (ref 0–1.22)
MONOCYTES NFR BLD: 4 % (ref 4–12)
NEUTROPHILS # BLD MANUAL: 3.77 THOUSAND/UL (ref 1.85–7.62)
NEUTS SEG NFR BLD AUTO: 55 % (ref 43–75)
PLATELET # BLD AUTO: 232 THOUSANDS/UL (ref 149–390)
PLATELET BLD QL SMEAR: ADEQUATE
PMV BLD AUTO: 12.1 FL (ref 8.9–12.7)
POLYCHROMASIA BLD QL SMEAR: PRESENT
POTASSIUM SERPL-SCNC: 3.3 MMOL/L (ref 3.5–5.3)
PROT SERPL-MCNC: 7.6 G/DL (ref 6.4–8.4)
RBC # BLD AUTO: 5.04 MILLION/UL (ref 3.81–5.12)
RBC MORPH BLD: NORMAL
SODIUM SERPL-SCNC: 135 MMOL/L (ref 135–147)
TRIGL SERPL-MCNC: 107 MG/DL
WBC # BLD AUTO: 6.85 THOUSAND/UL (ref 4.31–10.16)

## 2023-06-27 PROCEDURE — 80048 BASIC METABOLIC PNL TOTAL CA: CPT | Performed by: EMERGENCY MEDICINE

## 2023-06-27 PROCEDURE — 84703 CHORIONIC GONADOTROPIN ASSAY: CPT | Performed by: PHYSICIAN ASSISTANT

## 2023-06-27 PROCEDURE — 96374 THER/PROPH/DIAG INJ IV PUSH: CPT

## 2023-06-27 PROCEDURE — G1004 CDSM NDSC: HCPCS

## 2023-06-27 PROCEDURE — 96375 TX/PRO/DX INJ NEW DRUG ADDON: CPT

## 2023-06-27 PROCEDURE — 99223 1ST HOSP IP/OBS HIGH 75: CPT | Performed by: STUDENT IN AN ORGANIZED HEALTH CARE EDUCATION/TRAINING PROGRAM

## 2023-06-27 PROCEDURE — 99285 EMERGENCY DEPT VISIT HI MDM: CPT | Performed by: EMERGENCY MEDICINE

## 2023-06-27 PROCEDURE — 85007 BL SMEAR W/DIFF WBC COUNT: CPT | Performed by: EMERGENCY MEDICINE

## 2023-06-27 PROCEDURE — 85027 COMPLETE CBC AUTOMATED: CPT | Performed by: EMERGENCY MEDICINE

## 2023-06-27 PROCEDURE — 99284 EMERGENCY DEPT VISIT MOD MDM: CPT

## 2023-06-27 PROCEDURE — 96376 TX/PRO/DX INJ SAME DRUG ADON: CPT

## 2023-06-27 PROCEDURE — 93005 ELECTROCARDIOGRAM TRACING: CPT

## 2023-06-27 PROCEDURE — 36415 COLL VENOUS BLD VENIPUNCTURE: CPT | Performed by: EMERGENCY MEDICINE

## 2023-06-27 PROCEDURE — 80076 HEPATIC FUNCTION PANEL: CPT | Performed by: EMERGENCY MEDICINE

## 2023-06-27 PROCEDURE — 76705 ECHO EXAM OF ABDOMEN: CPT

## 2023-06-27 PROCEDURE — 84478 ASSAY OF TRIGLYCERIDES: CPT | Performed by: STUDENT IN AN ORGANIZED HEALTH CARE EDUCATION/TRAINING PROGRAM

## 2023-06-27 PROCEDURE — 83690 ASSAY OF LIPASE: CPT | Performed by: EMERGENCY MEDICINE

## 2023-06-27 PROCEDURE — 96361 HYDRATE IV INFUSION ADD-ON: CPT

## 2023-06-27 PROCEDURE — 74177 CT ABD & PELVIS W/CONTRAST: CPT

## 2023-06-27 RX ORDER — HYDROMORPHONE HCL/PF 1 MG/ML
1 SYRINGE (ML) INJECTION ONCE
Status: COMPLETED | OUTPATIENT
Start: 2023-06-27 | End: 2023-06-27

## 2023-06-27 RX ORDER — HYDROMORPHONE HCL IN WATER/PF 6 MG/30 ML
0.2 PATIENT CONTROLLED ANALGESIA SYRINGE INTRAVENOUS
Status: DISCONTINUED | OUTPATIENT
Start: 2023-06-27 | End: 2023-06-27

## 2023-06-27 RX ORDER — ENOXAPARIN SODIUM 100 MG/ML
40 INJECTION SUBCUTANEOUS DAILY
Status: DISCONTINUED | OUTPATIENT
Start: 2023-06-27 | End: 2023-06-28

## 2023-06-27 RX ORDER — HYDROMORPHONE HCL/PF 1 MG/ML
0.5 SYRINGE (ML) INJECTION ONCE
Status: COMPLETED | OUTPATIENT
Start: 2023-06-27 | End: 2023-06-27

## 2023-06-27 RX ORDER — SODIUM CHLORIDE 9 MG/ML
200 INJECTION, SOLUTION INTRAVENOUS CONTINUOUS
Status: DISPENSED | OUTPATIENT
Start: 2023-06-27 | End: 2023-06-29

## 2023-06-27 RX ORDER — HYDROMORPHONE HCL/PF 1 MG/ML
1 SYRINGE (ML) INJECTION EVERY 4 HOURS PRN
Status: DISCONTINUED | OUTPATIENT
Start: 2023-06-27 | End: 2023-06-30 | Stop reason: HOSPADM

## 2023-06-27 RX ORDER — SENNOSIDES 8.6 MG
1 TABLET ORAL DAILY
Status: DISCONTINUED | OUTPATIENT
Start: 2023-06-27 | End: 2023-06-30 | Stop reason: HOSPADM

## 2023-06-27 RX ORDER — ONDANSETRON 2 MG/ML
4 INJECTION INTRAMUSCULAR; INTRAVENOUS ONCE
Status: COMPLETED | OUTPATIENT
Start: 2023-06-27 | End: 2023-06-27

## 2023-06-27 RX ORDER — NICOTINE 21 MG/24HR
21 PATCH, TRANSDERMAL 24 HOURS TRANSDERMAL ONCE
Status: COMPLETED | OUTPATIENT
Start: 2023-06-27 | End: 2023-06-28

## 2023-06-27 RX ORDER — OXYCODONE HYDROCHLORIDE 10 MG/1
10 TABLET ORAL EVERY 4 HOURS PRN
Status: DISCONTINUED | OUTPATIENT
Start: 2023-06-27 | End: 2023-06-30 | Stop reason: HOSPADM

## 2023-06-27 RX ORDER — ONDANSETRON 2 MG/ML
4 INJECTION INTRAMUSCULAR; INTRAVENOUS EVERY 4 HOURS PRN
Status: DISCONTINUED | OUTPATIENT
Start: 2023-06-27 | End: 2023-06-30 | Stop reason: HOSPADM

## 2023-06-27 RX ORDER — POTASSIUM CHLORIDE 14.9 MG/ML
20 INJECTION INTRAVENOUS ONCE
Status: COMPLETED | OUTPATIENT
Start: 2023-06-27 | End: 2023-06-28

## 2023-06-27 RX ORDER — OXYCODONE HYDROCHLORIDE 5 MG/1
5 TABLET ORAL EVERY 4 HOURS PRN
Status: DISCONTINUED | OUTPATIENT
Start: 2023-06-27 | End: 2023-06-30 | Stop reason: HOSPADM

## 2023-06-27 RX ORDER — ACETAMINOPHEN 325 MG/1
650 TABLET ORAL EVERY 6 HOURS PRN
Status: DISCONTINUED | OUTPATIENT
Start: 2023-06-27 | End: 2023-06-30 | Stop reason: HOSPADM

## 2023-06-27 RX ORDER — SODIUM CHLORIDE, SODIUM LACTATE, POTASSIUM CHLORIDE, CALCIUM CHLORIDE 600; 310; 30; 20 MG/100ML; MG/100ML; MG/100ML; MG/100ML
200 INJECTION, SOLUTION INTRAVENOUS CONTINUOUS
Status: DISCONTINUED | OUTPATIENT
Start: 2023-06-27 | End: 2023-06-27

## 2023-06-27 RX ADMIN — NICOTINE 21 MG: 21 PATCH, EXTENDED RELEASE TRANSDERMAL at 15:03

## 2023-06-27 RX ADMIN — SENNOSIDES 8.6 MG: 8.6 TABLET, FILM COATED ORAL at 17:02

## 2023-06-27 RX ADMIN — ONDANSETRON 4 MG: 2 INJECTION INTRAMUSCULAR; INTRAVENOUS at 12:09

## 2023-06-27 RX ADMIN — HYDROMORPHONE HYDROCHLORIDE 1 MG: 1 INJECTION, SOLUTION INTRAMUSCULAR; INTRAVENOUS; SUBCUTANEOUS at 23:31

## 2023-06-27 RX ADMIN — HYDROMORPHONE HYDROCHLORIDE 1 MG: 1 INJECTION, SOLUTION INTRAMUSCULAR; INTRAVENOUS; SUBCUTANEOUS at 13:49

## 2023-06-27 RX ADMIN — HYDROMORPHONE HYDROCHLORIDE 0.2 MG: 0.2 INJECTION, SOLUTION INTRAMUSCULAR; INTRAVENOUS; SUBCUTANEOUS at 17:03

## 2023-06-27 RX ADMIN — POTASSIUM CHLORIDE 20 MEQ: 14.9 INJECTION, SOLUTION INTRAVENOUS at 16:56

## 2023-06-27 RX ADMIN — POTASSIUM CHLORIDE 20 MEQ: 14.9 INJECTION, SOLUTION INTRAVENOUS at 18:34

## 2023-06-27 RX ADMIN — IOHEXOL 100 ML: 350 INJECTION, SOLUTION INTRAVENOUS at 19:17

## 2023-06-27 RX ADMIN — SODIUM CHLORIDE 200 ML/HR: 0.9 INJECTION, SOLUTION INTRAVENOUS at 16:54

## 2023-06-27 RX ADMIN — SODIUM CHLORIDE 1000 ML: 0.9 INJECTION, SOLUTION INTRAVENOUS at 12:14

## 2023-06-27 RX ADMIN — ENOXAPARIN SODIUM 40 MG: 40 INJECTION SUBCUTANEOUS at 17:03

## 2023-06-27 RX ADMIN — HYDROMORPHONE HYDROCHLORIDE 0.5 MG: 1 INJECTION, SOLUTION INTRAMUSCULAR; INTRAVENOUS; SUBCUTANEOUS at 12:15

## 2023-06-27 RX ADMIN — HYDROMORPHONE HYDROCHLORIDE 1 MG: 1 INJECTION, SOLUTION INTRAMUSCULAR; INTRAVENOUS; SUBCUTANEOUS at 19:37

## 2023-06-27 RX ADMIN — SODIUM CHLORIDE 200 ML/HR: 0.9 INJECTION, SOLUTION INTRAVENOUS at 21:46

## 2023-06-27 RX ADMIN — ONDANSETRON 4 MG: 2 INJECTION INTRAMUSCULAR; INTRAVENOUS at 18:25

## 2023-06-27 NOTE — ASSESSMENT & PLAN NOTE
· Patient has had left lower leg and left knee pain, swelling since 6/20  · Likely left gastrocnemius muscle tear, seen by orthopedic surgery 6/26 outpatient  · Recent venous duplex LLE with no evidence of DVT  · Recent x-ray left knee with small left suprapatellar joint effusion, otherwise no acute findings  · Ice, analgesics

## 2023-06-27 NOTE — PLAN OF CARE
Problem: GASTROINTESTINAL - ADULT  Goal: Minimal or absence of nausea and/or vomiting  Description: INTERVENTIONS:  - Administer IV fluids if ordered to ensure adequate hydration  - Maintain NPO status until nausea and vomiting are resolved  - Nasogastric tube if ordered  - Administer ordered antiemetic medications as needed  - Provide nonpharmacologic comfort measures as appropriate  - Advance diet as tolerated, if ordered  - Consider nutrition services referral to assist patient with adequate nutrition and appropriate food choices  Outcome: Progressing  Goal: Maintains or returns to baseline bowel function  Description: INTERVENTIONS:  - Assess bowel function  - Encourage oral fluids to ensure adequate hydration  - Administer IV fluids if ordered to ensure adequate hydration  - Administer ordered medications as needed  - Encourage mobilization and activity  - Consider nutritional services referral to assist patient with adequate nutrition and appropriate food choices  Outcome: Progressing  Goal: Maintains adequate nutritional intake  Description: INTERVENTIONS:  - Monitor percentage of each meal consumed  - Identify factors contributing to decreased intake, treat as appropriate  - Assist with meals as needed  - Monitor I&O, weight, and lab values if indicated  - Obtain nutrition services referral as needed  Outcome: Progressing  Goal: Establish and maintain optimal ostomy function  Description: INTERVENTIONS:  - Assess bowel function  - Encourage oral fluids to ensure adequate hydration  - Administer IV fluids if ordered to ensure adequate hydration   - Administer ordered medications as needed  - Encourage mobilization and activity  - Nutrition services referral to assist patient with appropriate food choices  - Assess stoma site  - Consider wound care consult   Outcome: Progressing  Goal: Oral mucous membranes remain intact  Description: INTERVENTIONS  - Assess oral mucosa and hygiene practices  - Implement preventative oral hygiene regimen  - Implement oral medicated treatments as ordered  - Initiate Nutrition services referral as needed  Outcome: Progressing     Problem: METABOLIC, FLUID AND ELECTROLYTES - ADULT  Goal: Electrolytes maintained within normal limits  Description: INTERVENTIONS:  - Monitor labs and assess patient for signs and symptoms of electrolyte imbalances  - Administer electrolyte replacement as ordered  - Monitor response to electrolyte replacements, including repeat lab results as appropriate  - Instruct patient on fluid and nutrition as appropriate  Outcome: Progressing  Goal: Fluid balance maintained  Description: INTERVENTIONS:  - Monitor labs   - Monitor I/O and WT  - Instruct patient on fluid and nutrition as appropriate  - Assess for signs & symptoms of volume excess or deficit  Outcome: Progressing  Goal: Glucose maintained within target range  Description: INTERVENTIONS:  - Monitor Blood Glucose as ordered  - Assess for signs and symptoms of hyperglycemia and hypoglycemia  - Administer ordered medications to maintain glucose within target range  - Assess nutritional intake and initiate nutrition service referral as needed  Outcome: Progressing     Problem: HEMATOLOGIC - ADULT  Goal: Maintains hematologic stability  Description: INTERVENTIONS  - Assess for signs and symptoms of bleeding or hemorrhage  - Monitor labs  - Administer supportive blood products/factors as ordered and appropriate  Outcome: Progressing

## 2023-06-27 NOTE — H&P
[facility] 44  H&P  Name: Corinne Marie 39 y o  female I MRN: [id number]  Unit/Bed#: -01 I Date of Admission: 6/27/2023   Date of Service: 6/27/2023 I Hospital Day: 0      Assessment/Plan   * Alcohol-induced acute pancreatitis  Assessment & Plan  Presented with RUQ abdominal pain x 2 days, reports intermittent abdominal pain over the past year  Admits heavy EtOH use on 6/24, since has had poor oral intake, associated nausea/vomiting  · Did not meet SIRS, afebrile wo leukocytosis  · RUQ US revealed cholelithiasis without evidence of acute cholecystitis  · Lipase 182  Mild elevation of total bili 1 09, AST 40  · Check CT a/p, lipid panel in a m  · Start aggressive IVFs, NPO to start  · Antiemetics, pain regimen  · Replete hypokalemia, monitor electrolytes    Left leg pain  Assessment & Plan  · Patient has had left lower leg and left knee pain, swelling since 6/20  · Likely left gastrocnemius muscle tear, seen by orthopedic surgery 6/26 outpatient  · Recent venous duplex LLE with no evidence of DVT  · Recent x-ray left knee with small left suprapatellar joint effusion, otherwise no acute findings  · Ice, analgesics    Tobacco use disorder, continuous  Assessment & Plan  · Nicotine patches       VTE Pharmacologic Prophylaxis: VTE Score: 2 Low Risk (Score 0-2) - Encourage Ambulation  Code Status: Level 1 - Full Code   Discussion with family: Patient declined call to   Anticipated Length of Stay: Patient will be admitted on an observation basis with an anticipated length of stay of less than 2 midnights secondary to IV fluids, pain management      Total Time Spent on Date of Encounter in care of patient: 55 minutes This time was spent on one or more of the following: performing physical exam; counseling and coordination of care; obtaining or reviewing history; documenting in the medical record; reviewing/ordering tests, medications or procedures; communicating with other healthcare professionals and discussing with patient's family/caregivers  Chief Complaint: RUQ abdominal pain    History of Present Illness:  Dean Ac is a 39 y o  female with a PMH of tobacco use, who presents with RUQ abdominal pain for the past 2 days  Patient admits to heavy alcohol use on Saturday 6/24, since Sunday 6/25 has had poor oral intake, nausea with frequent vomiting  Patient notes abdominal pain is mainly in RUQ, mainly consistent but occasionally waxes/wanes, no relieving factors  Patient reports multiple vomiting episodes, nonbilious, no hematemesis  Patient noted that she has had pain similar to this few times in the last year but did not seek care, this time symptoms did not improve  Denies recent infections, antibiotics, surgeries, travel, abdominal surgical history  Review of Systems:  Review of Systems   Constitutional: Positive for appetite change  Negative for activity change, chills, diaphoresis, fatigue, fever and unexpected weight change  Respiratory: Negative for cough, chest tightness and shortness of breath  Cardiovascular: Positive for leg swelling  Negative for chest pain and palpitations  Gastrointestinal: Positive for abdominal pain, nausea and vomiting  Negative for abdominal distention, blood in stool, constipation and diarrhea  Genitourinary: Negative for difficulty urinating, dysuria, frequency and hematuria  Musculoskeletal: Positive for arthralgias and joint swelling  Negative for back pain, gait problem and myalgias  Neurological: Negative for dizziness, weakness, light-headedness and headaches         Past Medical and Surgical History:   Past Medical History:   Diagnosis Date   • Alcoholic intoxication without complication ([facility] ) 52/28/2788   • Anxiety    • Atypical squamous cell of undetermined significance of cervix 08/23/2019   • Bipolar disorder ([facility] )    • Chlamydia    • Depression    • Drug overdose 8/27/2016   • Gonorrhea    • History of mammogram 08/30/2019    Normal    • HPV (human papilloma virus) infection 08/23/2019   • Miscarriage    • Papanicolaou smear for cervical cancer screening 08/15/2019    ASCUS; HPV pos 2019    • Psychiatric illness    • Suicidal ideation 8/27/2016   • Suicide attempt [facility])        Past Surgical History:   Procedure Laterality Date   • COLPOSCOPY      x2 while pregnant    • WISDOM TOOTH EXTRACTION         Meds/Allergies:  Prior to Admission medications    Medication Sig Start Date End Date Taking? Authorizing Provider   Diclofenac Sodium (VOLTAREN) 1 % Apply 2 g topically 4 (four) times a day 6/20/23   Xochilt Farfan,    meloxicam (Mobic) 15 mg tablet Take 1 tablet (15 mg total) by mouth daily With food 6/26/23   Gokul Hinton,    ondansetron (Zofran ODT) 4 mg disintegrating tablet Take 1 tablet (4 mg total) by mouth every 8 (eight) hours as needed for nausea or vomiting (related to menses) 4/5/22   Henry Sexton PA-C   naproxen (Naprosyn) 500 mg tablet Take 1 tablet (500 mg total) by mouth 2 (two) times a day as needed (pelvic pain/cramping with menses)  Patient not taking: Reported on 6/26/2023 4/5/22 6/27/23  Henry Sexton PA-C     I have reviewed home medications with patient personally      Allergies: No Known Allergies    Social History:  Marital Status: Single   Occupation:   Patient Pre-hospital Living Situation: Home, With spouse, With other family member: 3 children  Patient Pre-hospital Level of Mobility: walks  Patient Pre-hospital Diet Restrictions: None  Substance Use History:   Social History     Substance and Sexual Activity   Alcohol Use Yes    Comment: Drinks 1-2 drinks 1-2 X yearly     Social History     Tobacco Use   Smoking Status Every Day   • Packs/day: 0 50   • Years: 15 00   • Total pack years: 7 50   • Types: Cigarettes   Smokeless Tobacco Never     Social History     Substance and Sexual Activity   Drug Use Not Currently   • Types: Cocaine, Marijuana    Comment: "Last marijuana use around 6/24/23       Family History:  Family History   Problem Relation Age of Onset   • Suicidality Mother    • Depression Mother    • No Known Problems Father    • No Known Problems Daughter    • No Known Problems Maternal Grandmother    • No Known Problems Maternal Grandfather    • No Known Problems Paternal Grandmother    • No Known Problems Paternal Grandfather    • No Known Problems Son    • No Known Problems Son        Physical Exam:     Vitals:   Blood Pressure: 125/83 (06/27/23 1626)  Pulse: 70 (06/27/23 1626)  Temperature: 97 6 °F (36 4 °C) (06/27/23 1626)  Temp Source: Oral (06/27/23 1626)  Respirations: 18 (06/27/23 1626)  Height: 5' 8\" (172 7 cm) (06/27/23 1626)  Weight - Scale: 91 6 kg (202 lb) (06/27/23 1626)  SpO2: 99 % (06/27/23 1626)    Physical Exam  Constitutional:       General: She is not in acute distress  Appearance: She is not ill-appearing, toxic-appearing or diaphoretic  Cardiovascular:      Rate and Rhythm: Normal rate and regular rhythm  Pulses: Normal pulses  Heart sounds: Normal heart sounds  Pulmonary:      Effort: Pulmonary effort is normal  No respiratory distress  Breath sounds: Normal breath sounds  Abdominal:      General: There is no distension  Palpations: Abdomen is soft  Tenderness: There is abdominal tenderness  There is no guarding  Comments: Slightly hypoactive bowel sounds  Moderate tenderness to palpation of RUQ, epigastric regions  Musculoskeletal:         General: Swelling and tenderness present  Comments: Increased swelling of left knee, small patellar effusion visualized but nontender  Mild tenderness to palpation of left calf  Skin:     General: Skin is warm  Coloration: Skin is not jaundiced or pale  Neurological:      General: No focal deficit present  Mental Status: She is alert     Psychiatric:         Mood and Affect: Mood normal          Behavior: Behavior normal  " Additional Data:     Lab Results:  Results from last 7 days   Lab Units 06/27/23  1209   WBC Thousand/uL 6 85   HEMOGLOBIN g/dL 14 6   HEMATOCRIT % 44 0   PLATELETS Thousands/uL 232   LYMPHO PCT % 37   MONO PCT % 4   EOS PCT % 4     Results from last 7 days   Lab Units 06/27/23  1209   SODIUM mmol/L 135   POTASSIUM mmol/L 3 3*   CHLORIDE mmol/L 101   CO2 mmol/L 25   BUN mg/dL 4*   CREATININE mg/dL 0 64   ANION GAP mmol/L 9   CALCIUM mg/dL 9 2   ALBUMIN g/dL 3 8   TOTAL BILIRUBIN mg/dL 1 09*   ALK PHOS U/L 80   ALT U/L 31   AST U/L 40*   GLUCOSE RANDOM mg/dL 100                       Lines/Drains:  Invasive Devices     Peripheral Intravenous Line  Duration           Peripheral IV 06/27/23 Left Antecubital <1 day                    Imaging: Reviewed radiology reports from this admission including: ultrasound(s)  US right upper quadrant   Final Result by Shanae Matute MD (06/27 1414)      Cholelithiasis without additional sonographic evidence of acute cholecystitis  Workstation performed: [id number]         CT abdomen pelvis w contrast    (Results Pending)       EKG and Other Studies Reviewed on Admission:   · EKG: NSR  HR 65     ** Please Note: This note has been constructed using a voice recognition system   **

## 2023-06-27 NOTE — ED PROVIDER NOTES
History  Chief Complaint   Patient presents with   • Vomiting     Pt reports vomiting since Sunday, unable to keep anything down  C/o mid upper abdominal pain, denies diarrhea     Is a 48-year-old female who presents to the emergency department with right upper quadrant pain since last evening  Patient states that she has had intermittent abdominal pain in the same area over the last several months to a year  Worse with eating especially fried foods  Usually gets better on its own  Last night pain started but has not stopped  Persistent in nature  She did have vomiting this morning  Denies any fevers or chills  Moderate severity  She does have some radiation to her back  Has not eaten since yesterday  Denies any dysuria frequency or urgency  Denies chance of pregnancy  No black or bloody stool  No chest pain  Differential diagnosis includes cholecystitis, pancreatitis, gastroenteritis  Prior to Admission Medications   Prescriptions Last Dose Informant Patient Reported? Taking?    Diclofenac Sodium (VOLTAREN) 1 % Not Taking Self No No   Sig: Apply 2 g topically 4 (four) times a day   Patient not taking: Reported on 6/27/2023   meloxicam (Mobic) 15 mg tablet Not Taking  No No   Sig: Take 1 tablet (15 mg total) by mouth daily With food   Patient not taking: Reported on 6/27/2023   ondansetron (Zofran ODT) 4 mg disintegrating tablet Not Taking Self No No   Sig: Take 1 tablet (4 mg total) by mouth every 8 (eight) hours as needed for nausea or vomiting (related to menses)   Patient not taking: Reported on 6/27/2023      Facility-Administered Medications: None       Past Medical History:   Diagnosis Date   • [facility] intoxication [facility] ) 68/23/9171   • Anxiety    • Atypical squamous cell of undetermined significance of cervix 08/23/2019   • Bipolar disorder ([facility] )    • Chlamydia    • Depression    • Drug overdose 8/27/2016   • Gonorrhea    • History of mammogram 08/30/2019 Normal    • HPV (human papilloma virus) infection 08/23/2019   • Miscarriage    • Papanicolaou smear for cervical cancer screening 08/15/2019    ASCUS; HPV pos 2019    • Psychiatric illness    • Suicidal ideation 8/27/2016   • Suicide attempt [facility])        Past Surgical History:   Procedure Laterality Date   • COLPOSCOPY      x2 while pregnant    • WISDOM TOOTH EXTRACTION         Family History   Problem Relation Age of Onset   • Suicidality Mother    • Depression Mother    • No Known Problems Father    • No Known Problems Daughter    • No Known Problems Maternal Grandmother    • No Known Problems Maternal Grandfather    • No Known Problems Paternal Grandmother    • No Known Problems Paternal Grandfather    • No Known Problems Son    • No Known Problems Son      I have reviewed and agree with the history as documented  E-Cigarette/Vaping   • E-Cigarette Use Never User      E-Cigarette/Vaping Substances   • Nicotine No    • THC Yes    • CBD No    • Flavoring No    • Other No    • Unknown No      Social History     Tobacco Use   • Smoking status: Every Day     Packs/day: 0 50     Years: 15 00     Total pack years: 7 50     Types: Cigarettes   • Smokeless tobacco: Never   Vaping Use   • Vaping Use: Never used   Substance Use Topics   • Alcohol use: Yes     Comment: Drinks 1-2 drinks 1-2 X yearly   • Drug use: Not Currently     Types: Cocaine, Marijuana     Comment: Last marijuana use around 6/24/23       Review of Systems   Constitutional: Negative for activity change, appetite change, chills and fever  HENT: Negative for congestion, ear pain, rhinorrhea and sore throat  Respiratory: Negative for cough, shortness of breath and wheezing  Cardiovascular: Negative for chest pain and palpitations  Gastrointestinal: Positive for abdominal pain, nausea and vomiting  Negative for blood in stool, constipation and diarrhea  Endocrine: Negative for polyuria     Genitourinary: Negative for difficulty urinating, dysuria, frequency and urgency  Musculoskeletal: Negative for arthralgias and myalgias  Skin: Negative for color change and rash  Allergic/Immunologic: Negative for immunocompromised state  Neurological: Negative for dizziness, syncope and light-headedness  Hematological: Does not bruise/bleed easily  Psychiatric/Behavioral: Negative for confusion  All other systems reviewed and are negative  Physical Exam  Physical Exam  Vitals and nursing note reviewed  Constitutional:       General: She is not in acute distress  Appearance: She is well-developed  HENT:      Head: Normocephalic and atraumatic  Nose: Nose normal    Eyes:      General: No scleral icterus  Conjunctiva/sclera: Conjunctivae normal    Cardiovascular:      Rate and Rhythm: Normal rate and regular rhythm  Heart sounds: Normal heart sounds  Pulmonary:      Effort: Pulmonary effort is normal  No respiratory distress  Breath sounds: Normal breath sounds  No stridor  No wheezing  Abdominal:      General: Bowel sounds are normal  There is no distension  Palpations: Abdomen is soft  Tenderness: There is abdominal tenderness in the right upper quadrant  There is no guarding or rebound  Positive signs include Dudley's sign  Negative signs include Rovsing's sign and McBurney's sign  Musculoskeletal:         General: No deformity  Cervical back: Normal range of motion and neck supple  Skin:     General: Skin is warm and dry  Findings: No rash  Neurological:      Mental Status: She is alert and oriented to person, place, and time  Psychiatric:         Thought Content:  Thought content normal          Vital Signs  ED Triage Vitals   Temperature Pulse Respirations Blood Pressure SpO2   06/27/23 1133 06/27/23 1132 06/27/23 1132 06/27/23 1133 06/27/23 1132   98 2 °F (36 8 °C) 90 16 157/90 100 %      Temp Source Heart Rate Source Patient Position - Orthostatic VS BP Location FiO2 (%)   06/27/23 1133 06/27/23 1343 06/27/23 1343 06/27/23 1343 --   Oral Monitor Sitting Left arm       Pain Score       06/27/23 1215       9           Vitals:    06/27/23 1343 06/27/23 1538 06/27/23 1626 06/27/23 1929   BP: 141/80 122/91 125/83 133/89   Pulse: 92 98 70 85   Patient Position - Orthostatic VS: Sitting Sitting Lying Sitting         Visual Acuity      ED Medications  Medications   nicotine (NICODERM CQ) 21 mg/24 hr TD 24 hr patch 21 mg (21 mg Transdermal Medication Applied 6/27/23 1503)   acetaminophen (TYLENOL) tablet 650 mg (has no administration in time range)   oxyCODONE (ROXICODONE) IR tablet 5 mg (has no administration in time range)   oxyCODONE (ROXICODONE) immediate release tablet 10 mg (has no administration in time range)   senna (SENOKOT) tablet 8 6 mg (8 6 mg Oral Given 6/27/23 1702)   enoxaparin (LOVENOX) subcutaneous injection 40 mg (40 mg Subcutaneous Given 6/27/23 1703)   sodium chloride 0 9 % infusion (200 mL/hr Intravenous New Bag 6/27/23 1654)   ondansetron (ZOFRAN) injection 4 mg (4 mg Intravenous Given 6/27/23 1825)   HYDROmorphone (DILAUDID) injection 1 mg (1 mg Intravenous Given 6/27/23 1937)   sodium chloride 0 9 % bolus 1,000 mL (0 mL Intravenous Stopped 6/27/23 1500)   HYDROmorphone (DILAUDID) injection 0 5 mg (0 5 mg Intravenous Given 6/27/23 1215)   ondansetron (ZOFRAN) injection 4 mg (4 mg Intravenous Given 6/27/23 1209)   HYDROmorphone (DILAUDID) injection 1 mg (1 mg Intravenous Given 6/27/23 1349)   potassium chloride 20 mEq IVPB (premix) (20 mEq Intravenous New Bag 6/27/23 1656)     Followed by   potassium chloride 20 mEq IVPB (premix) (20 mEq Intravenous New Bag 6/27/23 1834)   iohexol (OMNIPAQUE) 240 MG/ML solution 50 mL (50 mL Oral Given 6/27/23 1917)   iohexol (OMNIPAQUE) 350 MG/ML injection (MULTI-DOSE) 100 mL (100 mL Intravenous Given 6/27/23 1917)       Diagnostic Studies  Results Reviewed     Procedure Component Value Units Date/Time    Triglycerides [[id number]]  (Normal) Collected: 06/27/23 1209    Lab Status: Final result Specimen: Blood from Arm, Left Updated: 06/27/23 1949     Triglycerides 107 mg/dL     hCG, serum, qualitative [[id number]]  (Normal) Collected: 06/27/23 1209    Lab Status: Final result Specimen: Blood from Arm, Left Updated: 06/27/23 1854     Preg, Serum Negative    POCT pregnancy, urine [[id number]]     Lab Status: No result     CBC and differential [[id number]]  (Normal) Collected: 06/27/23 1209    Lab Status: Final result Specimen: Blood from Arm, Left Updated: 06/27/23 1311     WBC 6 85 Thousand/uL      RBC 5 04 Million/uL      Hemoglobin 14 6 g/dL      Hematocrit 44 0 %      MCV 87 fL      MCH 29 0 pg      MCHC 33 2 g/dL      RDW 14 8 %      MPV 12 1 fL      Platelets 667 Thousands/uL     Narrative: This is an appended report  These results have been appended to a previously verified report      Manual Differential(PHLEBS Do Not Order) [[id number]] Collected: 06/27/23 1209    Lab Status: Final result Specimen: Blood from Arm, Left Updated: 06/27/23 1311     Segmented % 55 %      Lymphocytes % 37 %      Monocytes % 4 %      Eosinophils, % 4 %      Basophils % 0 %      Absolute Neutrophils 3 77 Thousand/uL      Lymphocytes Absolute 2 53 Thousand/uL      Monocytes Absolute 0 27 Thousand/uL      Eosinophils Absolute 0 27 Thousand/uL      Basophils Absolute 0 00 Thousand/uL      Total Counted --     RBC Morphology Normal     Platelet Estimate Adequate     Giant PLTs Present     Polychromasia Present    Basic metabolic panel [[id number]]  (Abnormal) Collected: 06/27/23 1209    Lab Status: Final result Specimen: Blood from Arm, Left Updated: 06/27/23 1228     Sodium 135 mmol/L      Potassium 3 3 mmol/L      Chloride 101 mmol/L      CO2 25 mmol/L      ANION GAP 9 mmol/L      BUN 4 mg/dL      Creatinine 0 64 mg/dL      Glucose 100 mg/dL      Calcium 9 2 mg/dL      eGFR 108 ml/min/1 73sq m     Narrative:      [place] guidelines for Chronic Kidney Disease (CKD):   •  Stage 1 with normal or high GFR (GFR > 90 mL/min/1 73 square meters)  •  Stage 2 Mild CKD (GFR = 60-89 mL/min/1 73 square meters)  •  Stage 3A Moderate CKD (GFR = 45-59 mL/min/1 73 square meters)  •  Stage 3B Moderate CKD (GFR = 30-44 mL/min/1 73 square meters)  •  Stage 4 Severe CKD (GFR = 15-29 mL/min/1 73 square meters)  •  Stage 5 End Stage CKD (GFR <15 mL/min/1 73 square meters)  Note: GFR calculation is accurate only with a steady state creatinine    Hepatic function panel [[id number]]  (Abnormal) Collected: 06/27/23 1209    Lab Status: Final result Specimen: Blood from Arm, Left Updated: 06/27/23 1228     Total Bilirubin 1 09 mg/dL      Bilirubin, Direct 0 20 mg/dL      Alkaline Phosphatase 80 U/L      AST 40 U/L      ALT 31 U/L      Total Protein 7 6 g/dL      Albumin 3 8 g/dL     Lipase [[id number]]  (Abnormal) Collected: 06/27/23 1209    Lab Status: Final result Specimen: Blood from Arm, Left Updated: 06/27/23 1228     Lipase 182 u/L                  CT abdomen pelvis w contrast   Final Result by Deb Joseph MD (06/27 2000)      Acute pancreatitis, with peripancreatic inflammatory edema  No evidence of necrosis or discrete fluid collection  Cholelithiasis  Workstation performed: [id number]         US right upper quadrant   Final Result by Xin Bland MD (06/27 1414)      Cholelithiasis without additional sonographic evidence of acute cholecystitis        Workstation performed: [id number]                    Procedures  ECG 12 Lead Documentation Only    Date/Time: 6/27/2023 3:13 PM    Performed by: Justina Albert MD  Authorized by: Justina Albert MD    Indications / Diagnosis:  Epigastric pain  ECG reviewed by me, the ED Provider: yes    Patient location:  ED  Rate:     ECG rate assessment: normal    Rhythm:     Rhythm: sinus rhythm and A-V block      Rhythm comment:  First-degree AV block  Ectopy:     Ectopy: none    QRS:     QRS axis: Normal    QRS intervals:  Normal  Conduction:     Conduction: normal    ST segments:     ST segments:  Normal  T waves:     T waves: normal               ED Course  ED Course as of 06/27/23 2104 Tue Jun 27, 2023   1437 Patient with persistent abdominal pain  Nausea has improved  US is negative for cholecystitis  I suspect pancreatitis at this time  Has not been able to eat for 2 days due to pain  1450 Discussed with Dr Kodi Matthews who will accept as observation admission  Medical Decision Making  Patient with epigastric pain and right upper quadrant pain  Ultrasound revealed gallbladder without signs of acute cholecystitis  Patient does have elevated lipase  She has not been able to eat in 2 days  Required IV hydration and pain control x2 doses in the emergency department  Additionally required antiemetics  At this time I suspect pancreatitis  Patient does admit to drinking alcohol over the weekend more heavily than usual   She does not drink every day  Also has a history of hyperlipidemia which is a potential contributing factor  Given patient's inability to tolerate p o  times the last 2 days and poor pain control while in the emergency department will admit patient for n p o  status, hydration with diagnosis of pancreatitis  Amount and/or Complexity of Data Reviewed  External Data Reviewed: notes  Details: Review of external notes shows no recent ED visits for abdominal pain or pancreatitis symptoms  Patient did have recent outpatient visit in February 2023 with PCP for routine health screening  Labs: ordered  Decision-making details documented in ED Course  Details: Lipase is elevated  Radiology: ordered  ECG/medicine tests: independent interpretation performed  Discussion of management or test interpretation with external provider(s): Patient was discussed with [facility] attending  Will admit for pancreatitis  Risk  OTC drugs    Prescription drug management  Decision regarding hospitalization  Disposition  Final diagnoses:   Pancreatitis     Time reflects when diagnosis was documented in both MDM as applicable and the Disposition within this note     Time User Action Codes Description Comment    6/27/2023  2:45 PM Rafaela Hampton Add [K85 90] Pancreatitis     6/27/2023  5:48 PM Edie Pettit Add [K85 20] Alcohol-induced acute pancreatitis, unspecified complication status       ED Disposition     ED Disposition   Admit    Condition   Stable    Date/Time   Tue Jun 27, 2023  2:45 PM    Comment   Case was discussed with and the patient's admission status was agreed to be Admission Status: observation status to the service of Dr Kourtney Somers   Follow-up Information    None         Current Discharge Medication List      CONTINUE these medications which have NOT CHANGED    Details   Diclofenac Sodium (VOLTAREN) 1 % Apply 2 g topically 4 (four) times a day  Qty: 100 g, Refills: 0    Associated Diagnoses: Pain of left calf      meloxicam (Mobic) 15 mg tablet Take 1 tablet (15 mg total) by mouth daily With food  Qty: 30 tablet, Refills: 2    Associated Diagnoses: Gastrocnemius tear, left, initial encounter      ondansetron (Zofran ODT) 4 mg disintegrating tablet Take 1 tablet (4 mg total) by mouth every 8 (eight) hours as needed for nausea or vomiting (related to menses)  Qty: 30 tablet, Refills: 1    Associated Diagnoses: Nausea and vomiting, unspecified vomiting type             No discharge procedures on file      PDMP Review       Value Time User    PDMP Reviewed  Yes 10/6/2021  3:55 PM Annie Gandara MD          ED Provider  Electronically Signed by           Aliya Sun MD  06/27/23 0460

## 2023-06-27 NOTE — ASSESSMENT & PLAN NOTE
Presented with RUQ abdominal pain x 2 days, reports intermittent abdominal pain over the past year  Admits heavy EtOH use on 6/24, since has had poor oral intake, associated nausea/vomiting  · Did not meet SIRS, afebrile wo leukocytosis  · RUQ US revealed cholelithiasis without evidence of acute cholecystitis  · Lipase 182  Mild elevation of total bili 1 09, AST 40  · Check CT a/p, lipid panel in a m     · Start aggressive IVFs, NPO to start  · Antiemetics, pain regimen  · Replete hypokalemia, monitor electrolytes

## 2023-06-28 LAB
ALBUMIN SERPL BCP-MCNC: 3 G/DL (ref 3.5–5)
ALP SERPL-CCNC: 65 U/L (ref 34–104)
ALT SERPL W P-5'-P-CCNC: 21 U/L (ref 7–52)
ANION GAP SERPL CALCULATED.3IONS-SCNC: 7 MMOL/L
AST SERPL W P-5'-P-CCNC: 24 U/L (ref 13–39)
BILIRUB SERPL-MCNC: 0.8 MG/DL (ref 0.2–1)
BUN SERPL-MCNC: 4 MG/DL (ref 5–25)
CALCIUM ALBUM COR SERPL-MCNC: 8.9 MG/DL (ref 8.3–10.1)
CALCIUM SERPL-MCNC: 8.1 MG/DL (ref 8.4–10.2)
CHLORIDE SERPL-SCNC: 105 MMOL/L (ref 96–108)
CHOLEST SERPL-MCNC: 162 MG/DL
CO2 SERPL-SCNC: 24 MMOL/L (ref 21–32)
CREAT SERPL-MCNC: 0.55 MG/DL (ref 0.6–1.3)
ERYTHROCYTE [DISTWIDTH] IN BLOOD BY AUTOMATED COUNT: 14.9 % (ref 11.6–15.1)
GFR SERPL CREATININE-BSD FRML MDRD: 114 ML/MIN/1.73SQ M
GLUCOSE P FAST SERPL-MCNC: 80 MG/DL (ref 65–99)
GLUCOSE SERPL-MCNC: 80 MG/DL (ref 65–140)
HCT VFR BLD AUTO: 38.6 % (ref 34.8–46.1)
HDLC SERPL-MCNC: 57 MG/DL
HGB BLD-MCNC: 12.5 G/DL (ref 11.5–15.4)
LDLC SERPL CALC-MCNC: 87 MG/DL (ref 0–100)
LIPASE SERPL-CCNC: 896 U/L (ref 11–82)
MAGNESIUM SERPL-MCNC: 1.5 MG/DL (ref 1.9–2.7)
MCH RBC QN AUTO: 29.1 PG (ref 26.8–34.3)
MCHC RBC AUTO-ENTMCNC: 32.4 G/DL (ref 31.4–37.4)
MCV RBC AUTO: 90 FL (ref 82–98)
NONHDLC SERPL-MCNC: 105 MG/DL
PHOSPHATE SERPL-MCNC: 2.7 MG/DL (ref 2.7–4.5)
PLATELET # BLD AUTO: 178 THOUSANDS/UL (ref 149–390)
PMV BLD AUTO: 12.8 FL (ref 8.9–12.7)
POTASSIUM SERPL-SCNC: 3.5 MMOL/L (ref 3.5–5.3)
PROT SERPL-MCNC: 6 G/DL (ref 6.4–8.4)
RBC # BLD AUTO: 4.3 MILLION/UL (ref 3.81–5.12)
SODIUM SERPL-SCNC: 136 MMOL/L (ref 135–147)
TRIGL SERPL-MCNC: 90 MG/DL
WBC # BLD AUTO: 6.24 THOUSAND/UL (ref 4.31–10.16)

## 2023-06-28 PROCEDURE — 84100 ASSAY OF PHOSPHORUS: CPT | Performed by: STUDENT IN AN ORGANIZED HEALTH CARE EDUCATION/TRAINING PROGRAM

## 2023-06-28 PROCEDURE — 99232 SBSQ HOSP IP/OBS MODERATE 35: CPT | Performed by: PHYSICIAN ASSISTANT

## 2023-06-28 PROCEDURE — 80061 LIPID PANEL: CPT | Performed by: PHYSICIAN ASSISTANT

## 2023-06-28 PROCEDURE — 83690 ASSAY OF LIPASE: CPT | Performed by: PHYSICIAN ASSISTANT

## 2023-06-28 PROCEDURE — 83735 ASSAY OF MAGNESIUM: CPT | Performed by: STUDENT IN AN ORGANIZED HEALTH CARE EDUCATION/TRAINING PROGRAM

## 2023-06-28 PROCEDURE — 80053 COMPREHEN METABOLIC PANEL: CPT | Performed by: STUDENT IN AN ORGANIZED HEALTH CARE EDUCATION/TRAINING PROGRAM

## 2023-06-28 PROCEDURE — 85027 COMPLETE CBC AUTOMATED: CPT | Performed by: STUDENT IN AN ORGANIZED HEALTH CARE EDUCATION/TRAINING PROGRAM

## 2023-06-28 RX ORDER — MAGNESIUM SULFATE HEPTAHYDRATE 40 MG/ML
2 INJECTION, SOLUTION INTRAVENOUS ONCE
Status: COMPLETED | OUTPATIENT
Start: 2023-06-28 | End: 2023-06-28

## 2023-06-28 RX ADMIN — OXYCODONE HYDROCHLORIDE 10 MG: 10 TABLET ORAL at 11:07

## 2023-06-28 RX ADMIN — SODIUM CHLORIDE 200 ML/HR: 0.9 INJECTION, SOLUTION INTRAVENOUS at 21:24

## 2023-06-28 RX ADMIN — SODIUM CHLORIDE 200 ML/HR: 0.9 INJECTION, SOLUTION INTRAVENOUS at 16:22

## 2023-06-28 RX ADMIN — MAGNESIUM SULFATE HEPTAHYDRATE 2 G: 40 INJECTION, SOLUTION INTRAVENOUS at 10:58

## 2023-06-28 RX ADMIN — HYDROMORPHONE HYDROCHLORIDE 1 MG: 1 INJECTION, SOLUTION INTRAMUSCULAR; INTRAVENOUS; SUBCUTANEOUS at 08:30

## 2023-06-28 RX ADMIN — OXYCODONE HYDROCHLORIDE 10 MG: 10 TABLET ORAL at 22:47

## 2023-06-28 RX ADMIN — HYDROMORPHONE HYDROCHLORIDE 1 MG: 1 INJECTION, SOLUTION INTRAMUSCULAR; INTRAVENOUS; SUBCUTANEOUS at 04:22

## 2023-06-28 RX ADMIN — HYDROMORPHONE HYDROCHLORIDE 1 MG: 1 INJECTION, SOLUTION INTRAMUSCULAR; INTRAVENOUS; SUBCUTANEOUS at 19:44

## 2023-06-28 RX ADMIN — HYDROMORPHONE HYDROCHLORIDE 1 MG: 1 INJECTION, SOLUTION INTRAMUSCULAR; INTRAVENOUS; SUBCUTANEOUS at 23:54

## 2023-06-28 RX ADMIN — SENNOSIDES 8.6 MG: 8.6 TABLET, FILM COATED ORAL at 08:34

## 2023-06-28 RX ADMIN — ENOXAPARIN SODIUM 40 MG: 40 INJECTION SUBCUTANEOUS at 08:32

## 2023-06-28 RX ADMIN — HYDROMORPHONE HYDROCHLORIDE 1 MG: 1 INJECTION, SOLUTION INTRAMUSCULAR; INTRAVENOUS; SUBCUTANEOUS at 15:20

## 2023-06-28 NOTE — ASSESSMENT & PLAN NOTE
Presented with RUQ abdominal pain x 2 days, reports intermittent abdominal pain over the past year  Admits heavy EtOH use on 6/24, since has had poor oral intake, associated nausea with multiple vomiting episodes  · CT a/p showed acute pancreatitis with peripancreatic inflammatory edema but no necrosis or fluid collection  · RUQ US revealed cholelithiasis without evidence of acute cholecystitis  · Mild elevation of total bili 1 09, AST 40 on admission  Since resolved  · Did not meet SIRS, afebrile wo leukocytosis  · Lipid panel/triglycerides wnl  · Lipase 182->896  Still with severe abdominal pain     · Keep NPO, c/w aggressive IVFs at least 48 hrs  · Consider GI consult if symptoms do not improve  · Antiemetics, pain regimen  · Monitor lipase, LFTs and electrolytes

## 2023-06-28 NOTE — PLAN OF CARE
Patient is aaox3 with complaints of right uq abdominal pain and medicated as per orders  Patient with no respiratory or cardiac distress  Patients abd is tender in right upper quad, has +bsx4 and is soft  Patient is continent of bowel and bladder  Patient offer no other complaints at this time, will continue to monitor

## 2023-06-28 NOTE — PROGRESS NOTES
[facility]  Progress Note  Name: Sakshi Oliveros  MRN: [id number]  Unit/Bed#: -01 I Date of Admission: 6/27/2023   Date of Service: 6/28/2023 I Hospital Day: 0    Assessment/Plan   * Alcohol-induced acute pancreatitis  Assessment & Plan  Presented with RUQ abdominal pain x 2 days, reports intermittent abdominal pain over the past year  Admits heavy EtOH use on 6/24, since has had poor oral intake, associated nausea with multiple vomiting episodes  · CT a/p showed acute pancreatitis with peripancreatic inflammatory edema but no necrosis or fluid collection  · RUQ US revealed cholelithiasis without evidence of acute cholecystitis  · Mild elevation of total bili 1 09, AST 40 on admission  Since resolved  · Did not meet SIRS, afebrile wo leukocytosis  · Lipid panel/triglycerides wnl  · Lipase 182->896  Still with severe abdominal pain  · Keep NPO, c/w aggressive IVFs at least 48 hrs  · Consider GI consult if symptoms do not improve  · Antiemetics, pain regimen  · Monitor lipase, LFTs and electrolytes    Left leg pain  Assessment & Plan  · Patient has had left lower leg/left knee pain, swelling since 6/20  · Likely left gastrocnemius muscle tear, seen by orthopedic surgery 6/26 outpatient  · Recent venous duplex LLE with no evidence of DVT  · Recent x-ray left knee with small left suprapatellar joint effusion, otherwise no acute findings  · Ice, analgesics, supportive care    Tobacco use disorder, continuous  Assessment & Plan  · Nicotine patches  · Smoking cessation education         VTE Pharmacologic Prophylaxis: VTE Score: 2 Low Risk (Score 0-2) - Encourage Ambulation  Patient Centered Rounds: I performed bedside rounds with nursing staff today  Discussions with Specialists or Other Care Team Provider: None    Education and Discussions with Family / Patient: Patient declined call to        Total Time Spent on Date of Encounter in care of patient: 35 minutes This time was spent on one or more of the following: performing physical exam; counseling and coordination of care; obtaining or reviewing history; documenting in the medical record; reviewing/ordering tests, medications or procedures; communicating with other healthcare professionals and discussing with patient's family/caregivers  Current Length of Stay: 0 day(s)  Current Patient Status: Observation   Certification Statement: The patient, admitted on an observation basis, will now require > 2 midnight hospital stay due to IV fluids, pain/symptom management  Discharge Plan: Anticipate discharge in 24-48 hrs to home  Code Status: Level 1 - Full Code    Subjective:   Patient is seen at bedside this a m , reports significant improvement in nausea without vomiting overnight, although still with severe abdominal pain  Notes abdominal pain is in bilateral upper quadrants but mostly epigastric, /10 after receiving Dilaudid but mainly remains 10/10  Objective:     Vitals:   Temp (24hrs), Av 8 °F (36 6 °C), Min:97 5 °F (36 4 °C), Max:98 2 °F (36 8 °C)    Temp:  [97 5 °F (36 4 °C)-98 2 °F (36 8 °C)] 97 8 °F (36 6 °C)  HR:  [70-98] 82  Resp:  [16-20] 18  BP: (122-157)/(80-91) 132/90  SpO2:  [96 %-100 %] 98 %  Body mass index is 31 35 kg/m²  Input and Output Summary (last 24 hours): Intake/Output Summary (Last 24 hours) at 2023 1025  Last data filed at 2023 1500  Gross per 24 hour   Intake 1000 ml   Output --   Net 1000 ml       Physical Exam:   Physical Exam  Constitutional:       General: She is not in acute distress  Appearance: She is not ill-appearing, toxic-appearing or diaphoretic  Cardiovascular:      Rate and Rhythm: Normal rate and regular rhythm  Pulses: Normal pulses  Heart sounds: Normal heart sounds  Pulmonary:      Effort: Pulmonary effort is normal       Breath sounds: Normal breath sounds  Abdominal:      General: Bowel sounds are normal  There is no distension  Palpations: Abdomen is soft  Tenderness: There is abdominal tenderness  There is no guarding  Comments: Mild-moderate tenderness palpation of epigastric and bilateral upper quadrant regions, improved from yesterday  Musculoskeletal:         General: Swelling present  No tenderness  Comments: Mild left patellar swelling   Skin:     General: Skin is warm  Coloration: Skin is not jaundiced or pale  Neurological:      General: No focal deficit present  Mental Status: She is alert     Psychiatric:         Mood and Affect: Mood normal          Behavior: Behavior normal           Additional Data:     Labs:  Results from last 7 days   Lab Units 06/28/23  0424 06/27/23  1209   WBC Thousand/uL 6 24 6 85   HEMOGLOBIN g/dL 12 5 14 6   HEMATOCRIT % 38 6 44 0   PLATELETS Thousands/uL 178 232   LYMPHO PCT %  --  37   MONO PCT %  --  4   EOS PCT %  --  4     Results from last 7 days   Lab Units 06/28/23  0424   SODIUM mmol/L 136   POTASSIUM mmol/L 3 5   CHLORIDE mmol/L 105   CO2 mmol/L 24   BUN mg/dL 4*   CREATININE mg/dL 0 55*   ANION GAP mmol/L 7   CALCIUM mg/dL 8 1*   ALBUMIN g/dL 3 0*   TOTAL BILIRUBIN mg/dL 0 80   ALK PHOS U/L 65   ALT U/L 21   AST U/L 24   GLUCOSE RANDOM mg/dL 80                       Lines/Drains:  Invasive Devices     Peripheral Intravenous Line  Duration           Peripheral IV 06/27/23 Left Antecubital <1 day                  Imaging: Reviewed radiology reports from this admission including: abdominal/pelvic CT    Recent Cultures (last 7 days):         Last 24 Hours Medication List:   Current Facility-Administered Medications   Medication Dose Route Frequency Provider Last Rate   • acetaminophen  650 mg Oral Q6H PRN Yvette Adams DO     • HYDROmorphone  1 mg Intravenous Q4H PRN Jasmyne Dalal PA-C     • magnesium sulfate  2 g Intravenous Once Gisselle George PA-C     • nicotine  21 mg Transdermal Once Griselda Lux MD     • ondansetron  4 mg Intravenous Q4H PRN Jaden Lake PA-C     • oxyCODONE  10 mg Oral Q4H PRN Pandi Todhe, DO     • oxyCODONE  5 mg Oral Q4H PRN Pandi Todhe, DO     • senna  1 tablet Oral Daily Pandi Todhe, DO     • sodium chloride  200 mL/hr Intravenous Continuous Jaden Lake PA-C 200 mL/hr (06/27/23 2146)        Today, Patient Was Seen By: Jaden Lake PA-C    **Please Note: This note may have been constructed using a voice recognition system  **

## 2023-06-28 NOTE — ASSESSMENT & PLAN NOTE
· Patient has had left lower leg/left knee pain, swelling since 6/20  · Likely left gastrocnemius muscle tear, seen by orthopedic surgery 6/26 outpatient  · Recent venous duplex LLE with no evidence of DVT  · Recent x-ray left knee with small left suprapatellar joint effusion, otherwise no acute findings  · Ice, analgesics, supportive care

## 2023-06-28 NOTE — PLAN OF CARE
Problem: GASTROINTESTINAL - ADULT  Goal: Minimal or absence of nausea and/or vomiting  Description: INTERVENTIONS:  - Administer IV fluids if ordered to ensure adequate hydration  - Maintain NPO status until nausea and vomiting are resolved  - Nasogastric tube if ordered  - Administer ordered antiemetic medications as needed  - Provide nonpharmacologic comfort measures as appropriate  - Advance diet as tolerated, if ordered  - Consider nutrition services referral to assist patient with adequate nutrition and appropriate food choices  6/28/2023 1920 by Ron Land RN  Outcome: Progressing     Problem: GASTROINTESTINAL - ADULT  Goal: Maintains or returns to baseline bowel function  Description: INTERVENTIONS:  - Assess bowel function  - Encourage oral fluids to ensure adequate hydration  - Administer IV fluids if ordered to ensure adequate hydration  - Administer ordered medications as needed  - Encourage mobilization and activity  - Consider nutritional services referral to assist patient with adequate nutrition and appropriate food choices  6/28/2023 1920 by Ron Land RN  Outcome: Progressing  6/28/2023 1041 by Ron Land RN  Outcome: Progressing     Problem: GASTROINTESTINAL - ADULT  Goal: Maintains adequate nutritional intake  Description: INTERVENTIONS:  - Monitor percentage of each meal consumed  - Identify factors contributing to decreased intake, treat as appropriate  - Assist with meals as needed  - Monitor I&O, weight, and lab values if indicated  - Obtain nutrition services referral as needed  6/28/2023 1920 by Ron Land RN  Outcome: Progressing  6/28/2023 1041 by Ron Land RN  Outcome: Progressing     Problem: GASTROINTESTINAL - ADULT  Goal: Establish and maintain optimal ostomy function  Description: INTERVENTIONS:  - Assess bowel function  - Encourage oral fluids to ensure adequate hydration  - Administer IV fluids if ordered to ensure adequate hydration   - Administer ordered medications as needed  - Encourage mobilization and activity  - Nutrition services referral to assist patient with appropriate food choices  - Assess stoma site  - Consider wound care consult   6/28/2023 1920 by Zenaida Shah RN  Outcome: Progressing  6/28/2023 1041 by Zenaida Shah RN  Outcome: Progressing     Problem: GASTROINTESTINAL - ADULT  Goal: Oral mucous membranes remain intact  Description: INTERVENTIONS  - Assess oral mucosa and hygiene practices  - Implement preventative oral hygiene regimen  - Implement oral medicated treatments as ordered  - Initiate Nutrition services referral as needed  6/28/2023 1920 by Zenaida Shah RN  Outcome: Progressing  6/28/2023 1041 by Zenaida Shah RN  Outcome: Progressing

## 2023-06-29 LAB
ALBUMIN SERPL BCP-MCNC: 3 G/DL (ref 3.5–5)
ALP SERPL-CCNC: 56 U/L (ref 34–104)
ALT SERPL W P-5'-P-CCNC: 17 U/L (ref 7–52)
ANION GAP SERPL CALCULATED.3IONS-SCNC: 7 MMOL/L
AST SERPL W P-5'-P-CCNC: 21 U/L (ref 13–39)
ATRIAL RATE: 65 BPM
BILIRUB SERPL-MCNC: 0.9 MG/DL (ref 0.2–1)
BUN SERPL-MCNC: 2 MG/DL (ref 5–25)
CALCIUM ALBUM COR SERPL-MCNC: 8.3 MG/DL (ref 8.3–10.1)
CALCIUM SERPL-MCNC: 7.5 MG/DL (ref 8.4–10.2)
CHLORIDE SERPL-SCNC: 105 MMOL/L (ref 96–108)
CO2 SERPL-SCNC: 21 MMOL/L (ref 21–32)
CREAT SERPL-MCNC: 0.45 MG/DL (ref 0.6–1.3)
ERYTHROCYTE [DISTWIDTH] IN BLOOD BY AUTOMATED COUNT: 14.6 % (ref 11.6–15.1)
GFR SERPL CREATININE-BSD FRML MDRD: 122 ML/MIN/1.73SQ M
GLUCOSE P FAST SERPL-MCNC: 61 MG/DL (ref 65–99)
GLUCOSE SERPL-MCNC: 61 MG/DL (ref 65–140)
HCT VFR BLD AUTO: 37.1 % (ref 34.8–46.1)
HGB BLD-MCNC: 11.6 G/DL (ref 11.5–15.4)
LIPASE SERPL-CCNC: 571 U/L (ref 11–82)
MAGNESIUM SERPL-MCNC: 1.8 MG/DL (ref 1.9–2.7)
MCH RBC QN AUTO: 28.6 PG (ref 26.8–34.3)
MCHC RBC AUTO-ENTMCNC: 31.3 G/DL (ref 31.4–37.4)
MCV RBC AUTO: 92 FL (ref 82–98)
P AXIS: 65 DEGREES
PLATELET # BLD AUTO: 172 THOUSANDS/UL (ref 149–390)
PMV BLD AUTO: 12.7 FL (ref 8.9–12.7)
POTASSIUM SERPL-SCNC: 3.4 MMOL/L (ref 3.5–5.3)
PR INTERVAL: 220 MS
PROT SERPL-MCNC: 6 G/DL (ref 6.4–8.4)
QRS AXIS: 74 DEGREES
QRSD INTERVAL: 86 MS
QT INTERVAL: 430 MS
QTC INTERVAL: 447 MS
RBC # BLD AUTO: 4.05 MILLION/UL (ref 3.81–5.12)
SODIUM SERPL-SCNC: 133 MMOL/L (ref 135–147)
T WAVE AXIS: 71 DEGREES
VENTRICULAR RATE: 65 BPM
WBC # BLD AUTO: 7.99 THOUSAND/UL (ref 4.31–10.16)

## 2023-06-29 PROCEDURE — 99233 SBSQ HOSP IP/OBS HIGH 50: CPT | Performed by: PHYSICIAN ASSISTANT

## 2023-06-29 PROCEDURE — 85027 COMPLETE CBC AUTOMATED: CPT | Performed by: PHYSICIAN ASSISTANT

## 2023-06-29 PROCEDURE — 83690 ASSAY OF LIPASE: CPT | Performed by: PHYSICIAN ASSISTANT

## 2023-06-29 PROCEDURE — 80053 COMPREHEN METABOLIC PANEL: CPT | Performed by: PHYSICIAN ASSISTANT

## 2023-06-29 PROCEDURE — 83735 ASSAY OF MAGNESIUM: CPT | Performed by: PHYSICIAN ASSISTANT

## 2023-06-29 PROCEDURE — 93010 ELECTROCARDIOGRAM REPORT: CPT | Performed by: INTERNAL MEDICINE

## 2023-06-29 RX ORDER — MAGNESIUM SULFATE 1 G/100ML
1 INJECTION INTRAVENOUS ONCE
Status: COMPLETED | OUTPATIENT
Start: 2023-06-29 | End: 2023-06-29

## 2023-06-29 RX ORDER — POTASSIUM CHLORIDE 14.9 MG/ML
20 INJECTION INTRAVENOUS ONCE
Status: COMPLETED | OUTPATIENT
Start: 2023-06-29 | End: 2023-06-29

## 2023-06-29 RX ORDER — SODIUM CHLORIDE, SODIUM LACTATE, POTASSIUM CHLORIDE, CALCIUM CHLORIDE 600; 310; 30; 20 MG/100ML; MG/100ML; MG/100ML; MG/100ML
125 INJECTION, SOLUTION INTRAVENOUS CONTINUOUS
Status: DISCONTINUED | OUTPATIENT
Start: 2023-06-29 | End: 2023-06-30

## 2023-06-29 RX ORDER — POTASSIUM CHLORIDE 29.8 MG/ML
40 INJECTION INTRAVENOUS ONCE
Status: DISCONTINUED | OUTPATIENT
Start: 2023-06-29 | End: 2023-06-29 | Stop reason: CLARIF

## 2023-06-29 RX ADMIN — MAGNESIUM SULFATE HEPTAHYDRATE 1 G: 1 INJECTION, SOLUTION INTRAVENOUS at 13:43

## 2023-06-29 RX ADMIN — SODIUM CHLORIDE 200 ML/HR: 0.9 INJECTION, SOLUTION INTRAVENOUS at 03:05

## 2023-06-29 RX ADMIN — HYDROMORPHONE HYDROCHLORIDE 1 MG: 1 INJECTION, SOLUTION INTRAMUSCULAR; INTRAVENOUS; SUBCUTANEOUS at 12:10

## 2023-06-29 RX ADMIN — SODIUM CHLORIDE 200 ML/HR: 0.9 INJECTION, SOLUTION INTRAVENOUS at 13:45

## 2023-06-29 RX ADMIN — SODIUM CHLORIDE, SODIUM LACTATE, POTASSIUM CHLORIDE, AND CALCIUM CHLORIDE 125 ML/HR: .6; .31; .03; .02 INJECTION, SOLUTION INTRAVENOUS at 19:56

## 2023-06-29 RX ADMIN — SENNOSIDES 8.6 MG: 8.6 TABLET, FILM COATED ORAL at 08:20

## 2023-06-29 RX ADMIN — HYDROMORPHONE HYDROCHLORIDE 1 MG: 1 INJECTION, SOLUTION INTRAMUSCULAR; INTRAVENOUS; SUBCUTANEOUS at 16:14

## 2023-06-29 RX ADMIN — SODIUM CHLORIDE 200 ML/HR: 0.9 INJECTION, SOLUTION INTRAVENOUS at 09:51

## 2023-06-29 RX ADMIN — POTASSIUM CHLORIDE 20 MEQ: 14.9 INJECTION, SOLUTION INTRAVENOUS at 09:50

## 2023-06-29 RX ADMIN — HYDROMORPHONE HYDROCHLORIDE 1 MG: 1 INJECTION, SOLUTION INTRAMUSCULAR; INTRAVENOUS; SUBCUTANEOUS at 04:20

## 2023-06-29 RX ADMIN — POTASSIUM CHLORIDE 20 MEQ: 14.9 INJECTION, SOLUTION INTRAVENOUS at 11:21

## 2023-06-29 RX ADMIN — HYDROMORPHONE HYDROCHLORIDE 1 MG: 1 INJECTION, SOLUTION INTRAMUSCULAR; INTRAVENOUS; SUBCUTANEOUS at 20:29

## 2023-06-29 RX ADMIN — HYDROMORPHONE HYDROCHLORIDE 1 MG: 1 INJECTION, SOLUTION INTRAMUSCULAR; INTRAVENOUS; SUBCUTANEOUS at 08:22

## 2023-06-29 NOTE — ASSESSMENT & PLAN NOTE
· Patient has had left lower leg/left knee pain, swelling since 6/20  · Likely left gastrocnemius muscle tear, seen by orthopedic surgery 6/26 outpatient  · Recent venous duplex LLE with no evidence of DVT  · Recent x-ray left knee with small left suprapatellar joint effusion, otherwise no acute findings  · DORI Brooke

## 2023-06-29 NOTE — PROGRESS NOTES
[facility]  Progress Note  Name: Handy Narvaez  MRN: [id number]  Unit/Bed#: -01 I Date of Admission: 6/27/2023   Date of Service: 6/29/2023 I Hospital Day: 0    Assessment/Plan   * Alcohol-induced acute pancreatitis  Assessment & Plan  Presented with RUQ abdominal pain x 2 days, reports intermittent abdominal pain over the past year  Admits heavy EtOH use on 6/24/2023, since then she has had poor oral intake, associated nausea with multiple vomiting episodes  · CT a/p showed acute pancreatitis with peripancreatic inflammatory edema but no necrosis or fluid collection  Lipase elevated  · RUQ US revealed cholelithiasis without evidence of acute cholecystitis  · LFTs normalized  · No SIRS  · Lipid panel/triglycerides wnl  · Keep NPO, c/w aggressive IVFs thru 6/29/2023 then can wean down  · Consider GI consult if symptoms do not improve  · Antiemetics, pain regimen  · Advance diet today    Left leg pain  Assessment & Plan  · Patient has had left lower leg/left knee pain, swelling since 6/20  · Likely left gastrocnemius muscle tear, seen by orthopedic surgery 6/26 outpatient  · Recent venous duplex LLE with no evidence of DVT  · Recent x-ray left knee with small left suprapatellar joint effusion, otherwise no acute findings  · R  I  C  E     Tobacco use disorder, continuous  Assessment & Plan  · Nicotine patches  · Smoking cessation education         VTE Pharmacologic Prophylaxis: VTE Score: 2 Low Risk (Score 0-2) - Encourage Ambulation  Patient Centered Rounds: I performed bedside rounds with nursing staff today  Discussions with Specialists or Other Care Team Provider: none    Education and Discussions with Family / Patient: Patient declined call to        Total Time Spent on Date of Encounter in care of patient: 35 minutes This time was spent on one or more of the following: performing physical exam; counseling and coordination of care; obtaining or reviewing history; documenting in the medical record; reviewing/ordering tests, medications or procedures; communicating with other healthcare professionals and discussing with patient's family/caregivers  Current Length of Stay: 0 day(s)  Current Patient Status: Observation   Certification Statement: The patient will continue to require additional inpatient hospital stay due to ivf, diet advancement ,analgesia  Discharge Plan: Anticipate discharge tomorrow to home  Code Status: Level 1 - Full Code    Subjective:   Mild abd pain , she is hungry we enrique try diet    No nausea    Objective:     Vitals:   Temp (24hrs), Av 9 °F (36 6 °C), Min:97 6 °F (36 4 °C), Max:98 3 °F (36 8 °C)    Temp:  [97 6 °F (36 4 °C)-98 3 °F (36 8 °C)] 97 9 °F (36 6 °C)  HR:  [70-85] 82  Resp:  [16-18] 18  BP: (125-158)/(72-89) 128/72  SpO2:  [98 %-99 %] 99 %  Body mass index is 31 63 kg/m²  Input and Output Summary (last 24 hours): Intake/Output Summary (Last 24 hours) at 2023 1059  Last data filed at 2023 0951  Gross per 24 hour   Intake 5410 ml   Output --   Net 5410 ml       Physical Exam:   Physical Exam  Vitals and nursing note reviewed  Constitutional:       General: She is not in acute distress  HENT:      Head: Normocephalic and atraumatic  Nose: Nose normal       Mouth/Throat:      Mouth: Mucous membranes are moist    Eyes:      Conjunctiva/sclera: Conjunctivae normal    Cardiovascular:      Rate and Rhythm: Normal rate and regular rhythm  Pulses: Normal pulses  Pulmonary:      Effort: Pulmonary effort is normal    Abdominal:      General: Bowel sounds are normal  There is no distension  Palpations: Abdomen is soft  Tenderness: There is abdominal tenderness  Musculoskeletal:         General: No swelling  Cervical back: Neck supple  Skin:     Findings: No bruising  Neurological:      General: No focal deficit present  Mental Status: She is alert     Psychiatric: Behavior: Behavior normal           Additional Data:     Labs:  Results from last 7 days   Lab Units 06/29/23  0510 06/28/23  0424 06/27/23  1209   WBC Thousand/uL 7 99   < > 6 85   HEMOGLOBIN g/dL 11 6   < > 14 6   HEMATOCRIT % 37 1   < > 44 0   PLATELETS Thousands/uL 172   < > 232   LYMPHO PCT %  --   --  37   MONO PCT %  --   --  4   EOS PCT %  --   --  4    < > = values in this interval not displayed  Results from last 7 days   Lab Units 06/29/23  0510   SODIUM mmol/L 133*   POTASSIUM mmol/L 3 4*   CHLORIDE mmol/L 105   CO2 mmol/L 21   BUN mg/dL 2*   CREATININE mg/dL 0 45*   ANION GAP mmol/L 7   CALCIUM mg/dL 7 5*   ALBUMIN g/dL 3 0*   TOTAL BILIRUBIN mg/dL 0 90   ALK PHOS U/L 56   ALT U/L 17   AST U/L 21   GLUCOSE RANDOM mg/dL 61*                       Lines/Drains:  Invasive Devices     Peripheral Intravenous Line  Duration           Peripheral IV 06/29/23 Left;Ventral (anterior) Forearm <1 day                      Imaging: No pertinent imaging reviewed      Recent Cultures (last 7 days):         Last 24 Hours Medication List:   Current Facility-Administered Medications   Medication Dose Route Frequency Provider Last Rate   • acetaminophen  650 mg Oral Q6H PRN Amirai Todhe, DO     • HYDROmorphone  1 mg Intravenous Q4H PRN Connie Dalal PA-C     • magnesium sulfate  1 g Intravenous Once Berna Lezama PA-C     • ondansetron  4 mg Intravenous Q4H PRN Man Sanchez PA-C     • oxyCODONE  10 mg Oral Q4H PRN Amirai Todhe, DO     • oxyCODONE  5 mg Oral Q4H PRN Amirai Todhe, DO     • potassium chloride  20 mEq Intravenous Once Berna Lezama PA-C 20 mEq (06/29/23 0950)    Followed by   • potassium chloride  20 mEq Intravenous Once Berna Lezama PA-C     • senna  1 tablet Oral Daily Pandi Todhe, DO     • sodium chloride  200 mL/hr Intravenous Continuous Connie Dalal PA-C 200 mL/hr (06/29/23 5665)        Today, Patient Was Seen By: Yary Gibbs PA-C    **Please Note: This note may have been constructed using a voice recognition system  **

## 2023-06-29 NOTE — ASSESSMENT & PLAN NOTE
Presented with RUQ abdominal pain x 2 days, reports intermittent abdominal pain over the past year  Admits heavy EtOH use on 6/24/2023, since then she has had poor oral intake, associated nausea with multiple vomiting episodes  · CT a/p showed acute pancreatitis with peripancreatic inflammatory edema but no necrosis or fluid collection  Lipase elevated  · RUQ US revealed cholelithiasis without evidence of acute cholecystitis  · LFTs normalized  · No SIRS  · Lipid panel/triglycerides wnl    · Keep NPO, c/w aggressive IVFs thru 6/29/2023 then can wean down  · Consider GI consult if symptoms do not improve  · Antiemetics, pain regimen  · Advance diet today

## 2023-06-29 NOTE — UTILIZATION REVIEW
Initial Clinical Review    Admission: Date/Time/Statement:   Admission Orders (From admission, onward)     Ordered        06/27/23 1450  Place in Observation  Once                      Orders Placed This Encounter   Procedures   • Place in Observation     Standing Status:   Standing     Number of Occurrences:   1     Order Specific Question:   Level of Care     Answer:   Med Surg [16]     ED Arrival Information     Expected   -    Arrival   6/27/2023 11:26    Acuity   Urgent            Means of arrival   Walk-In    Escorted by   Self    Service   Hospitalist    Admission type   Emergency            Arrival complaint   abd pain vomitting            Chief Complaint   Patient presents with   • Vomiting     Pt reports vomiting since Sunday, unable to keep anything down  C/o mid upper abdominal pain, denies diarrhea       Initial Presentation: 39 y o  female presents to ED as a walk-in with c/o N/V and poor oral intake  Admits to heavy alcohol use on 6/24 and poor oral intake on 625 2/2 N/V  In the ED abd US was done which was negative for cholecystitis but noted for cholelithiasis  Lipase elevated at 182  Assessment/plan: Admit to M/S unit under observation with Pancreatitis - CT AP ordered, lipid panel  NPO, aggressive IVF  Pt following with orthopedics for left gastrocnemius tear  Continue supportive care    Date: 6/28   Day 2: reports improvement in N/V overnight although still with severe abd pain in b/l upper quadrants, mostly epigastric 9/10 after receiving Dilaudid but mostly 10/10  CT a/p showed acute pancreatitis with peripancreatic inflammatory edema but no necrosis or fluid collection  Lipase 182->896  Keep npo  Continue aggressive IVFs  Analgesics prn   Monitor lipase, LFTs and electrolytes      ED Triage Vitals   Temperature Pulse Respirations Blood Pressure SpO2   06/27/23 1133 06/27/23 1132 06/27/23 1132 06/27/23 1133 06/27/23 1132   98 2 °F (36 8 °C) 90 16 157/90 100 %      Temp Source Heart Rate Source Patient Position - Orthostatic VS BP Location FiO2 (%)   06/27/23 1133 06/27/23 1343 06/27/23 1343 06/27/23 1343 --   Oral Monitor Sitting Left arm       Pain Score       06/27/23 1215       9          Wt Readings from Last 1 Encounters:   06/29/23 94 3 kg (208 lb)     Additional Vital Signs:   Date/Time Temp Pulse Resp BP MAP (mmHg) SpO2 O2 Device Patient Position - Orthostatic VS   06/29/23 0730 97 9 °F (36 6 °C) 82 18 128/72 -- 99 % -- Lying   06/28/23 2324 98 3 °F (36 8 °C) 70 18 158/89 -- 99 % None (Room air) Lying   06/28/23 1557 97 6 °F (36 4 °C) 85 16 125/80 -- 98 % -- Lying   06/28/23 0718 97 8 °F (36 6 °C) 82 18 132/90 -- 98 % -- Lying   06/27/23 2353 97 5 °F (36 4 °C) 79 20 126/85 -- 96 % -- Lying   06/27/23 1929 97 9 °F (36 6 °C) 85 18 133/89 104 98 % None (Room air) Sitting   06/27/23 1703 -- -- -- -- -- 99 % None (Room air) --   06/27/23 1626 97 6 °F (36 4 °C) 70 18 125/83 99 99 % None (Room air) Lying   06/27/23 1538 -- 98 16 122/91 -- 99 % None (Room air) Sitting   06/27/23 1343 97 7 °F (36 5 °C) 92 17 141/80 -- 99 % None (Room air) Sitting   06/27/23 1133 98 2 °F (36 8 °C) -- -- 157/90 -- -- -- --   06/27/23 1132 -- 90 16 -- -- 100 % -- --     Pertinent Labs/Diagnostic Test Results:   CT abdomen pelvis w contrast   Final Result by Ernesto Sharif MD (06/27 2000)      Acute pancreatitis, with peripancreatic inflammatory edema  No evidence of necrosis or discrete fluid collection  Cholelithiasis  Workstation performed: [id number]         US right upper quadrant   Final Result by Michelle Guevara MD (06/27 1414)      Cholelithiasis without additional sonographic evidence of acute cholecystitis        Workstation performed: [id number]               Results from last 7 days   Lab Units 06/29/23  0510 06/28/23  0424 06/27/23  1209   WBC Thousand/uL 7 99 6 24 6 85   HEMOGLOBIN g/dL 11 6 12 5 14 6   HEMATOCRIT % 37 1 38 6 44 0   PLATELETS Thousands/uL 172 178 232     Results from last 7 days   Lab Units 06/29/23  0510 06/28/23  0424 06/27/23  1209   SODIUM mmol/L 133* 136 135   POTASSIUM mmol/L 3 4* 3 5 3 3*   CHLORIDE mmol/L 105 105 101   CO2 mmol/L 21 24 25   ANION GAP mmol/L 7 7 9   BUN mg/dL 2* 4* 4*   CREATININE mg/dL 0 45* 0 55* 0 64   EGFR ml/min/1 73sq m 122 114 108   CALCIUM mg/dL 7 5* 8 1* 9 2   MAGNESIUM mg/dL 1 8* 1 5*  --    PHOSPHORUS mg/dL  --  2 7  --      Results from last 7 days   Lab Units 06/29/23  0510 06/28/23  0424 06/27/23  1209   AST U/L 21 24 40*   ALT U/L 17 21 31   ALK PHOS U/L 56 65 80   TOTAL PROTEIN g/dL 6 0* 6 0* 7 6   ALBUMIN g/dL 3 0* 3 0* 3 8   TOTAL BILIRUBIN mg/dL 0 90 0 80 1 09*   BILIRUBIN DIRECT mg/dL  --   --  0 20     Results from last 7 days   Lab Units 06/29/23  0510 06/28/23  0424 06/27/23  1209   GLUCOSE RANDOM mg/dL 61* 80 100         Results from last 7 days   Lab Units 06/29/23  0510 06/28/23  0424 06/27/23  1209   LIPASE u/L 571* 896* 182*         ED Treatment:   Medication Administration from 06/27/2023 1126 to 06/27/2023 1620       Date/Time Order Dose Route Action     06/27/2023 1214 EDT sodium chloride 0 9 % bolus 1,000 mL 1,000 mL Intravenous New Bag     06/27/2023 1215 EDT HYDROmorphone (DILAUDID) injection 0 5 mg 0 5 mg Intravenous Given     06/27/2023 1209 EDT ondansetron (ZOFRAN) injection 4 mg 4 mg Intravenous Given     06/27/2023 1349 EDT HYDROmorphone (DILAUDID) injection 1 mg 1 mg Intravenous Given     06/27/2023 1503 EDT nicotine (NICODERM CQ) 21 mg/24 hr TD 24 hr patch 21 mg 21 mg Transdermal Medication Applied     Past Medical History:   Diagnosis Date   • Alcoholic intoxication without complication ([facility] ) 19/38/3043   • Anxiety    • Atypical squamous cell of undetermined significance of cervix 08/23/2019   • Bipolar disorder ([facility] )    • Chlamydia    • Depression    • Drug overdose 8/27/2016   • Gonorrhea    • History of mammogram 08/30/2019    Normal    • HPV (human papilloma virus) infection 08/23/2019   • Miscarriage    • Papanicolaou smear for cervical cancer screening 08/15/2019    ASCUS; HPV pos 2019    • Psychiatric illness    • Suicidal ideation 8/27/2016   • Suicide attempt [facility])      Present on Admission:  • Tobacco use disorder, continuous      Admitting Diagnosis: Pancreatitis [K85 90]  Vomiting [R11 10]  Age/Sex: 39 y o  female  Admission Orders:  Scheduled Medications:  magnesium sulfate, 1 g, Intravenous, Once  potassium chloride, 20 mEq, Intravenous, Once   Followed by  potassium chloride, 20 mEq, Intravenous, Once  senna, 1 tablet, Oral, Daily    Continuous IV Infusions:  sodium chloride, 200 mL/hr, Intravenous, Continuous    PRN Meds:  acetaminophen, 650 mg, Oral, Q6H PRN  HYDROmorphone, 1 mg, Intravenous, Q4H PRN 6/27 x2, 6/28 x5, 6/29 x2  ondansetron, 4 mg, Intravenous, Q4H PRN  oxyCODONE, 10 mg, Oral, Q4H PRN 6/28 x2  oxyCODONE, 5 mg, Oral, Q4H PRN        IP CONSULT TO ALCOHOL BRIEF INTERVENTION TRAUMA    Network Utilization Review Department  ATTENTION: Please call with any questions or concerns to [phone] and carefully listen to the prompts so that you are directed to the right person  All voicemails are confidential   Yovani Sood all requests for admission clinical reviews, approved or denied determinations and any other requests to dedicated fax number below belonging to the campus where the patient is receiving treatment   List of dedicated fax numbers for the Facilities:  [address] DENIALS (Administrative/Medical Necessity) [phone]   [address] [place] [address] (Maternity/NICU/Pediatrics) [phone]   [address] Tamika Robertson 77 [phone]   [address] [place] [address] [facility] [place] [address] [place] [address] [address] 819 [facility] [phone]

## 2023-06-30 VITALS
DIASTOLIC BLOOD PRESSURE: 72 MMHG | OXYGEN SATURATION: 98 % | TEMPERATURE: 98.3 F | HEART RATE: 74 BPM | BODY MASS INDEX: 31.34 KG/M2 | HEIGHT: 68 IN | SYSTOLIC BLOOD PRESSURE: 119 MMHG | RESPIRATION RATE: 18 BRPM | WEIGHT: 206.8 LBS

## 2023-06-30 PROCEDURE — 99239 HOSP IP/OBS DSCHRG MGMT >30: CPT | Performed by: NURSE PRACTITIONER

## 2023-06-30 RX ADMIN — OXYCODONE HYDROCHLORIDE 5 MG: 5 TABLET ORAL at 00:43

## 2023-06-30 NOTE — PLAN OF CARE
Problem: GASTROINTESTINAL - ADULT  Goal: Minimal or absence of nausea and/or vomiting  Description: INTERVENTIONS:  - Administer IV fluids if ordered to ensure adequate hydration  - Maintain NPO status until nausea and vomiting are resolved  - Nasogastric tube if ordered  - Administer ordered antiemetic medications as needed  - Provide nonpharmacologic comfort measures as appropriate  - Advance diet as tolerated, if ordered  - Consider nutrition services referral to assist patient with adequate nutrition and appropriate food choices  Outcome: Progressing     Problem: HEMATOLOGIC - ADULT  Goal: Maintains hematologic stability  Description: INTERVENTIONS  - Assess for signs and symptoms of bleeding or hemorrhage  - Monitor labs  - Administer supportive blood products/factors as ordered and appropriate  Outcome: Progressing     Problem: PAIN - ADULT  Goal: Verbalizes/displays adequate comfort level or baseline comfort level  Description: Interventions:  - Encourage patient to monitor pain and request assistance  - Assess pain using appropriate pain scale  - Administer analgesics based on type and severity of pain and evaluate response  - Implement non-pharmacological measures as appropriate and evaluate response  - Consider cultural and social influences on pain and pain management  - Notify physician/advanced practitioner if interventions unsuccessful or patient reports new pain  Outcome: Progressing     Problem: INFECTION - ADULT  Goal: Absence of fever/infection during neutropenic period  Description: INTERVENTIONS:  - Monitor WBC    Outcome: Progressing

## 2023-06-30 NOTE — ASSESSMENT & PLAN NOTE
· Patient has had left lower leg/left knee pain, swelling since 6/20 due to left gastrocnemius muscle tear, seen by orthopedic surgery 6/26 outpatient  · Recent venous duplex LLE with no evidence of DVT  · Recent x-ray left knee with small left suprapatellar joint effusion, otherwise no acute findings  · Conservative treatment recommended by ortho, can follow up with them if she has continued pain

## 2023-06-30 NOTE — UTILIZATION REVIEW
NOTIFICATION OF INPATIENT ADMISSION   AUTHORIZATION REQUEST   SERVICING FACILITY:   [place] [address]  [address] [facility], [id number] [place] [address]  Tax ID: [id number]  NPI: [id number] ATTENDING PROVIDER:  Attending Name and NPI#: Natalie Klein [[id number]]  Address: [address] [place] [address], [address] [place] [address]  Phone:  [facility]  Place of Service Code: 21  Inpatient Admission Date/Time: 6/29/23  1:47 PM  Discharge Date/Time: 6/30/2023 12:24 PM  Admitting Diagnosis Code/Description:  Pancreatitis [K85 90]  Vomiting [R11 10]     UTILIZATION REVIEW CONTACT:  Mahesh Garrett, Utilization   Network Utilization Review Department  Phone: [phone]  Fax: [phone]  Email: [email or web address]  Contact for approvals/pending authorizations, clinical reviews, and discharge  PHYSICIAN ADVISORY SERVICES:  Medical Necessity Denial & [email or web address] Review  Phone: [phone]  Fax: [phone]  Email: [email or web address]

## 2023-06-30 NOTE — ASSESSMENT & PLAN NOTE
Presented with RUQ abdominal pain x 2 days, reports intermittent abdominal pain over the past year  Admits heavy EtOH use on 6/24/2023, since then she has had poor oral intake, associated nausea with multiple vomiting episodes  · CT a/p showed acute pancreatitis with peripancreatic inflammatory edema but no necrosis or fluid collection  Lipase elevated  · RUQ US revealed cholelithiasis without evidence of acute cholecystitis  · LFTs normalized  · No SIRS  · Lipid panel/triglycerides wnl  · Did well during admission  Pain completely resolved at discharge but lipase remains elevated  Had a long discussion about alcohol cessation and other causes of pancreatitis  Patient admits this was from heavy alcohol use prior to admission and will stop altogether

## 2023-06-30 NOTE — PLAN OF CARE
Problem: GASTROINTESTINAL - ADULT  Goal: Minimal or absence of nausea and/or vomiting  Description: INTERVENTIONS:  - Administer IV fluids if ordered to ensure adequate hydration  - Maintain NPO status until nausea and vomiting are resolved  - Nasogastric tube if ordered  - Administer ordered antiemetic medications as needed  - Provide nonpharmacologic comfort measures as appropriate  - Advance diet as tolerated, if ordered  - Consider nutrition services referral to assist patient with adequate nutrition and appropriate food choices  Outcome: Progressing  Goal: Maintains adequate nutritional intake  Description: INTERVENTIONS:  - Monitor percentage of each meal consumed  - Identify factors contributing to decreased intake, treat as appropriate  - Assist with meals as needed  - Monitor I&O, weight, and lab values if indicated  - Obtain nutrition services referral as needed  Outcome: Progressing

## 2023-06-30 NOTE — DISCHARGE SUMMARY
Mal[place] U  65   Discharge- Carisa Dennis 1980, 39 y o  female MRN: [id number]  Unit/Bed#: -01 Encounter: [id number]  Primary Care Provider: Carolina Coleman MD   Date and time admitted to hospital: 6/27/2023 11:30 AM    * Alcohol-induced acute pancreatitis  Assessment & Plan  Presented with RUQ abdominal pain x 2 days, reports intermittent abdominal pain over the past year  Admits heavy EtOH use on 6/24/2023, since then she has had poor oral intake, associated nausea with multiple vomiting episodes  · CT a/p showed acute pancreatitis with peripancreatic inflammatory edema but no necrosis or fluid collection  Lipase elevated  · RUQ US revealed cholelithiasis without evidence of acute cholecystitis  · LFTs normalized  · No SIRS  · Lipid panel/triglycerides wnl  · Did well during admission  Pain completely resolved at discharge but lipase remains elevated  Had a long discussion about alcohol cessation and other causes of pancreatitis  Patient admits this was from heavy alcohol use prior to admission and will stop altogether       Left leg pain  Assessment & Plan  · Patient has had left lower leg/left knee pain, swelling since 6/20 due to left gastrocnemius muscle tear, seen by orthopedic surgery 6/26 outpatient  · Recent venous duplex LLE with no evidence of DVT  · Recent x-ray left knee with small left suprapatellar joint effusion, otherwise no acute findings  · Conservative treatment recommended by ortho, can follow up with them if she has continued pain       Tobacco use disorder, continuous  Assessment & Plan  · Nicotine patches  · Smoking cessation education      Medical Problems     Resolved Problems  Date Reviewed: 6/30/2023   None       Discharging Physician / Practitioner: ZAKI Ying  PCP: Carolina Coleman MD  Admission Date:   Admission Orders (From admission, onward)     Ordered        06/29/23 1347  Inpatient Admission  Once            06/27/23 5795 Place in Observation  Once                      Discharge Date: 06/30/23    Consultations During Hospital Stay:  · None    Procedures Performed:   · CAT scan of the abdomen pelvis: Acute pancreatitis, with peripancreatic inflammatory edema  No evidence of necrosis or discrete fluid collection  Cholelithiasis  Significant Findings / Test Results:   · none    Incidental Findings:   ·  none       Test Results Pending at Discharge (will require follow up):   · none     Outpatient Tests Requested:  · none    Complications: None    Reason for Admission: Severe abdominal pain    Hospital Course:   Mack Farfan is a 39 y o  female patient who originally presented to the hospital on 6/27/2023 due to your abdominal pain  Patient states that she had left lower extremity pain and was diagnosed with a left gastrocnemius muscle tear  She then had an episode where she was binge drinking with friends and had significant abdominal pain 2 days prior to admission  She was found to have acute pancreatitis  She was given IV fluids and bowel rest   She did quite well and is currently pain-free today  She is tolerating surgical soft diet  Her and I had a long conversation today about completely abstaining from alcohol and she agrees that she never wants to experience that pain again  We checked her triglycerides which are within normal limits and gallstones and acute cholecystitis was ruled out  Patient had some electrolyte abnormalities which were corrected while in the hospital   She is tolerating her diet and is pain-free today  Her last lipase was over 500 however given the resolution of her symptoms, will discharge her home  Please see above list of diagnoses and related plan for additional information  Condition at Discharge: stable    Discharge Day Visit / Exam:   Subjective: States that her pain is much better and she is tolerating her diet without any difficulty  No nausea or vomiting  Wants to go home  " Understands that she has to completely abstain from alcohol  Follow-up with her primary care provider next week  Vitals: Blood Pressure: 119/72 (06/30/23 0733)  Pulse: 74 (06/30/23 0733)  Temperature: 98 3 °F (36 8 °C) (06/30/23 0733)  Temp Source: Oral (06/30/23 0733)  Respirations: 18 (06/30/23 0733)  Height: 5' 8\" (172 7 cm) (06/27/23 1626)  Weight - Scale: 93 8 kg (206 lb 12 8 oz) (06/30/23 0600)  SpO2: 98 % (06/30/23 0733)  Exam:   Physical Exam  Constitutional:       Appearance: She is not ill-appearing or toxic-appearing  HENT:      Mouth/Throat:      Mouth: Mucous membranes are moist    Cardiovascular:      Rate and Rhythm: Normal rate and regular rhythm  Pulses: Normal pulses  Heart sounds: Normal heart sounds  Pulmonary:      Effort: Pulmonary effort is normal       Breath sounds: Normal breath sounds  Abdominal:      General: Abdomen is flat  Palpations: Abdomen is soft  Musculoskeletal:         General: Normal range of motion  Cervical back: Normal range of motion  Skin:     General: Skin is warm and dry  Capillary Refill: Capillary refill takes less than 2 seconds  Neurological:      General: No focal deficit present  Mental Status: She is alert and oriented to person, place, and time  Psychiatric:         Mood and Affect: Mood normal          Behavior: Behavior normal             Discussion with Family: Updated  (significant other) at bedside  Discharge instructions/Information to patient and family:   See after visit summary for information provided to patient and family  Provisions for Follow-Up Care:  See after visit summary for information related to follow-up care and any pertinent home health orders  Disposition:   Home    Planned Readmission: Not anticipated     Discharge Statement:  I spent 38 minutes discharging the patient  This time was spent on the day of discharge   I had direct contact with the patient on the day of " discharge  Greater than 50% of the total time was spent examining patient, answering all patient questions, arranging and discussing plan of care with patient as well as directly providing post-discharge instructions  Additional time then spent on discharge activities  Discharge Medications:  See after visit summary for reconciled discharge medications provided to patient and/or family        **Please Note: This note may have been constructed using a voice recognition system**

## 2023-06-30 NOTE — UTILIZATION REVIEW
OBS 6/27 UPGRADED TO INPATIENT 6/29 FOR CONTINUED TX OF ACUTE PANCREATITIS WITH NEED FOR IVFS AND IV PAIN MEDS    06/29/23 1347  Inpatient Admission  Once        Transfer Service: Hospitalist    Question Answer Comment   Level of Care Med Surg        06/29/23 1347       Continued Stay Review    Date: 6/29-6/30/23                      Current Patient Class: inpatient  Current Level of Care: med/surg  HPI:43 y o  female initially admitted on 6/27 obs to inpatient 6/29    Assessment/Plan:   Upgraded to inpatient  6/29 -- Mild abd pain, no nausea, she is hungry we enrique try diet  Abdominal tenderness on exam  LFT's normalized  Keep NPO with aggressive IVFs through today, then wean down  Continue antiemetics/analgesics prn  Advance diet today  Supportive care  SCD  OOB        6/30 -- today pt denies pain  Currently silvestre a surgical soft diet  Lipase remains elevated  Provider had a long discussion about alcohol cessation and other causes of pancreatitis  Patient admits this was from heavy alcohol use prior to admission and will stop altogether  Ok to d/c home as pain as resolved  Will f/u with PCP op      Vital Signs:   Date/Time Temp Pulse Resp BP SpO2 O2 Device Patient Position - Orthostatic VS   06/30/23 0733 98 3 °F (36 8 °C) 74 18 119/72 98 % -- Lying   06/30/23 0028 98 °F (36 7 °C) 80 18 124/84 98 % None (Room air) Lying   06/29/23 2015 97 8 °F (36 6 °C) 78 18 125/76 98 % None (Room air) Lying   06/29/23 1522 97 4 °F (36 3 °C) Abnormal  70 16 129/80 99 % -- Lying   06/29/23 0730 97 9 °F (36 6 °C) 82 18 128/72 99 % -- Lying       Pertinent Labs/Diagnostic Results:     Results from last 7 days   Lab Units 06/29/23  0510 06/28/23  0424 06/27/23  1209   WBC Thousand/uL 7 99 6 24 6 85   HEMOGLOBIN g/dL 11 6 12 5 14 6   HEMATOCRIT % 37 1 38 6 44 0   PLATELETS Thousands/uL 172 178 232     Results from last 7 days   Lab Units 06/29/23  0510 06/28/23  0424 06/27/23  1209   SODIUM mmol/L 133* 136 135   POTASSIUM mmol/L 3 4* 3 5 3 3*   CHLORIDE mmol/L 105 105 101   CO2 mmol/L 21 24 25   ANION GAP mmol/L 7 7 9   BUN mg/dL 2* 4* 4*   CREATININE mg/dL 0 45* 0 55* 0 64   EGFR ml/min/1 73sq m 122 114 108   CALCIUM mg/dL 7 5* 8 1* 9 2   MAGNESIUM mg/dL 1 8* 1 5*  --    PHOSPHORUS mg/dL  --  2 7  --      Results from last 7 days   Lab Units 06/29/23  0510 06/28/23  0424 06/27/23  1209   AST U/L 21 24 40*   ALT U/L 17 21 31   ALK PHOS U/L 56 65 80   TOTAL PROTEIN g/dL 6 0* 6 0* 7 6   ALBUMIN g/dL 3 0* 3 0* 3 8   TOTAL BILIRUBIN mg/dL 0 90 0 80 1 09*   BILIRUBIN DIRECT mg/dL  --   --  0 20     Results from last 7 days   Lab Units 06/29/23  0510 06/28/23  0424 06/27/23  1209   GLUCOSE RANDOM mg/dL 61* 80 100     Results from last 7 days   Lab Units 06/29/23  0510 06/28/23  0424 06/27/23  1209   LIPASE u/L 571* 896* 182*       Medications:   Scheduled Medications:  senna, 1 tablet, Oral, Daily      Continuous IV Infusions:  lactated ringers infusion  Rate: 125 mL/hr Dose: 125 mL/hr  Freq: Continuous Route: IV  Indications of Use: IV Hydration  Last Dose: 125 mL/hr (06/29/23 1956)  Start: 06/29/23 2000 End: 06/30/23 0555      sodium chloride 0 9 % infusion  Rate: 200 mL/hr Dose: 200 mL/hr  Freq: Continuous Route: IV  Indications of Use: IV Hydration  Last Dose: Stopped (06/29/23 1912)  Start: 06/27/23 1500 End: 06/29/23 1653           PRN Meds:  acetaminophen, 650 mg, Oral, Q6H PRN  HYDROmorphone, 1 mg, Intravenous, Q4H PRN 6/27 x2, 6/28 x5, 6/29 x5  ondansetron, 4 mg, Intravenous, Q4H PRN  oxyCODONE, 10 mg, Oral, Q4H PRN 6/28 x2  oxyCODONE, 5 mg, Oral, Q4H PRN 6/30 x1      Discharge Plan: home when stable    Network Utilization Review Department  ATTENTION: Please call with any questions or concerns to [phone] and carefully listen to the prompts so that you are directed to the right person   All voicemails are confidential   Nixon Enrique all requests for admission clinical reviews, approved or denied determinations and any other requests to dedicated fax number below belonging to the campus where the patient is receiving treatment   List of dedicated fax numbers for the Facilities:  [address] [place] [address] DENIALS (Administrative/Medical Necessity) [phone]   [address]  (Maternity/NICU/Pediatrics) [phone]   [address] [phone]   Kera Robertson [address] [phone]   [address] [place] [address] [phone]   1558  134 815 [facility] [phone]

## 2023-07-03 ENCOUNTER — TRANSITIONAL CARE MANAGEMENT (OUTPATIENT)
Dept: FAMILY MEDICINE CLINIC | Facility: OTHER | Age: 43
End: 2023-07-03

## 2023-07-03 NOTE — UTILIZATION REVIEW
NOTIFICATION OF ADMISSION DISCHARGE   This is a Notification of Discharge from [address] [place] [address]. Please be advised that this patient has been discharge from our facility. Below you will find the admission and discharge date and time including the patient’s disposition. UTILIZATION REVIEW CONTACT:  Daysi Beck  Utilization   Network Utilization Review Department  Phone: [phone] x carefully listen to the prompts. All voicemails are confidential.  Email: [email or web address]     ADMISSION INFORMATION  PRESENTATION DATE: 6/27/2023 11:30 AM  OBERVATION ADMISSION DATE:   INPATIENT ADMISSION DATE: 6/29/23  1:47 PM   DISCHARGE DATE: 6/30/2023 12:24 PM   DISPOSITION:Home/Self Care    IMPORTANT INFORMATION:  Send all requests for admission clinical reviews, approved or denied determinations and any other requests to dedicated fax number below belonging to the campus where the patient is receiving treatment.  List of dedicated fax numbers:  [place] DENIALS (Administrative/Medical Necessity) [phone] 230[address] [facility] (Maternity/NICU/Pediatrics) [phone]   [facility] [phone]   [facility] [phone] 1636 [facility] [phone] 401 [facility] [phone]   [facility] [phone]   270 [facility] 608 [facility] [phone]   506 [facility] [phone] 3441 [facility] [phone]   2720 [facility] 3000 [facility] [phone]

## 2023-07-06 ENCOUNTER — TELEPHONE (OUTPATIENT)
Dept: FAMILY MEDICINE CLINIC | Facility: OTHER | Age: 43
End: 2023-07-06

## 2023-07-06 NOTE — TELEPHONE ENCOUNTER
Please remove PCP. Pt now seeing Jamil Andres MD at [facility] ([place]).

## 2023-07-13 ENCOUNTER — TELEPHONE (OUTPATIENT)
Dept: OBGYN CLINIC | Facility: HOSPITAL | Age: 43
End: 2023-07-13

## 2023-07-14 NOTE — TELEPHONE ENCOUNTER
07/14/23 3:19 PM        The office's request has been received, reviewed, and the patient chart updated. The PCP has successfully been removed with a patient attribution note. This message will now be completed.         Thank you  Topher Bass

## 2023-09-19 ENCOUNTER — OFFICE VISIT (OUTPATIENT)
Dept: OBGYN CLINIC | Facility: CLINIC | Age: 43
End: 2023-09-19
Payer: MEDICARE

## 2023-09-19 VITALS — BODY MASS INDEX: 31.34 KG/M2 | WEIGHT: 206.8 LBS | HEIGHT: 68 IN

## 2023-09-19 DIAGNOSIS — M25.562 LEFT KNEE PAIN, UNSPECIFIED CHRONICITY: Primary | ICD-10-CM

## 2023-09-19 PROCEDURE — 99214 OFFICE O/P EST MOD 30 MIN: CPT | Performed by: STUDENT IN AN ORGANIZED HEALTH CARE EDUCATION/TRAINING PROGRAM

## 2023-09-19 RX ORDER — MELOXICAM 15 MG/1
15 TABLET ORAL DAILY
Qty: 30 TABLET | Refills: 2 | Status: SHIPPED | OUTPATIENT
Start: 2023-09-19

## 2023-09-19 NOTE — PROGRESS NOTES
Orthopaedics Office Visit -follow-up patient Visit    ASSESSMENT/PLAN:    Assessment:   #1 left gastrocnemius muscle belly tearing, resolving  2. Left knee patellofemoral arthritis    Plan:   #1  Patient has improved with her posterior calf pain from her gastroc tear that she sustained at her last visit. She only went to 2 physical therapy sessions but states that she was improving and did not wish to continue    2. A prescription for meloxicam 15 mg p.o. once daily was prescribed to the patient. Risk medications discussed  3. Patient can continue to be weightbearing as tolerated activity as tolerated to the leftt lower extremity  4. Patient has signs and symptoms consistent with patellofemoral arthritis. In discussion of this clinical etiology was discussed with the patient. At this time we will begin with conservative treatment with anti-inflammatory medications which were prescribed. Discussed with the patient that if these are not successful in relieving her discomfort and we could proceed with corticosteroid injections/viscosupplementation's in the future. Patient at this time would like to avoid intra-articular injections and will proceed with anti-inflammatory medications  5. Patient can follow-up on an as-needed basis    To Do Next Visit:  As needed    _____________________________________________________  CHIEF COMPLAINT:  Chief Complaint   Patient presents with   • Left Knee - Follow-up, Pain, Swelling         SUBJECTIVE:  Joselin Siegel is a 39 y.o. female who presents today consultation for left lower leg pain. She was referred by Dr. Mili Bain. Patient was seen at the ED on 6/20/23 due to having left calf pain. She states states on 6/20/23 she was kneeling cleaning a bathtub at work and went to stand up and felt a two popping behind her left knee and calf pain. At the ED she had a venus duplex on and showed evidence of calf tear and prescribed Diclofenac gel.  She states she is having pain medial left calf and left knee swelling to the ankle. She notes left knee cracking but with no pain. Her pain is worse with weightbearing. She has been icing, taking Tylenol and using Voltaren gel with no relief. 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 She denies numbness or paresthesias. Interval history 9/19/2023  Patient is a 59-year-old female who presents today due to continued left knee pain. The patient was seen last for a left gastrocnemius muscle tear that she sustained while at work. The patient states that over the past 3 months the pain in the posterior calf has resolved. She has been able to return to her normal activities. She does state that as the calf pain resolved she started to have pain in her left knee with a grinding sensation and occasional clicking located behind her kneecap. The patient has not undergone any specific treatment for this. The patient went to 2 physical therapy sessions but then stopped as she felt it was not helping her. She denies any numbness or paresthesias.   Denies any secondary trauma's    PAST MEDICAL HISTORY:  Past Medical History:   Diagnosis Date   • Alcoholic intoxication without complication ([address]) 11/52/9540   • Anxiety    • Atypical squamous cell of undetermined significance of cervix 08/23/2019   • Bipolar disorder ([address])    • Chlamydia    • Depression    • Drug overdose 8/27/2016   • Gonorrhea    • History of mammogram 08/30/2019    Normal    • HPV (human papilloma virus) infection 08/23/2019   • Miscarriage    • Papanicolaou smear for cervical cancer screening 08/15/2019    ASCUS; HPV pos 2019    • Psychiatric illness    • Suicidal ideation 8/27/2016   • Suicide attempt [facility])        PAST SURGICAL HISTORY:  Past Surgical History:   Procedure Laterality Date   • COLPOSCOPY      x2 while pregnant    • WISDOM TOOTH EXTRACTION         FAMILY HISTORY:  Family History   Problem Relation Age of Onset   • Suicidality Mother    • Depression Mother    • No Known Problems Father    • No Known Problems Daughter    • No Known Problems Maternal Grandmother    • No Known Problems Maternal Grandfather    • No Known Problems Paternal Grandmother    • No Known Problems Paternal Grandfather    • No Known Problems Son    • No Known Problems Son        SOCIAL HISTORY:  Social History     Tobacco Use   • Smoking status: Every Day     Packs/day: 0.50     Years: 15.00     Total pack years: 7.50     Types: Cigarettes   • Smokeless tobacco: Never   Vaping Use   • Vaping Use: Never used   Substance Use Topics   • Alcohol use: Yes     Comment: Drinks 1-2 drinks 1-2 X yearly   • Drug use: Not Currently     Types: Cocaine, Marijuana     Comment: Last marijuana use around 6/24/23       MEDICATIONS:    Current Outpatient Medications:   •  meloxicam (Mobic) 15 mg tablet, Take 1 tablet (15 mg total) by mouth daily Discontinue GI upset occurs, Disp: 30 tablet, Rfl: 2    ALLERGIES:  No Known Allergies    REVIEW OF SYSTEMS:  MSK: Left leg   Neuro: None   Pertinent items are otherwise noted in HPI. A comprehensive review of systems was otherwise negative. LABS:  HgA1c:   Lab Results   Component Value Date    HGBA1C 5.5 12/09/2022     BMP:   Lab Results   Component Value Date    CALCIUM 7.5 (L) 06/29/2023    K 3.4 (L) 06/29/2023    CO2 21 06/29/2023     06/29/2023    BUN 2 (L) 06/29/2023    CREATININE 0.45 (L) 06/29/2023     CBC: No components found for: "CBC"    _____________________________________________________  PHYSICAL EXAMINATION:  Vital signs: Ht 5' 8" (1.727 m)   Wt 93.8 kg (206 lb 12.8 oz)   BMI 31.44 kg/m²   General: No acute distress, awake and alert  Psychiatric: Mood and affect appear appropriate  HEENT: Trachea Midline, No torticollis, no apparent facial trauma  Cardiovascular: No audible murmurs;  Extremities appear perfused  Pulmonary: No audible wheezing or stridor  Skin: No open lesions; see further details (if any) below    MUSCULOSKELETAL EXAMINATION:  Extremities:\  The left lower extremity was exposed inspected. The skin overlying the left lower extremity is intact without any superficial abrasions or lacerations. The patient is no longer tenderness over the posterior gastrocnemius muscles. She has no tenderness along the posterior muscular compartment. The patient has tenderness medially and laterally parapatellar location. The patient has no medial or lateral joint line tenderness. Patient has crepitus throughout range of motion of the patellofemoral joint and range of motion from 0 to 120 degrees. Patient's knee is stable to varus and valgus stress at 0 and 30 degrees. Negative anterior and posterior drawer testing. .  Intact extensor mechanism the patient's sensation is intact to light touch in the superficial peroneal, deep peroneal, sural, saphenous, plantar nerve distributions. Tibialis anterior, extensor hallux longus, gastrocnemius muscles intact. Limb is well-perfused. Compartment soft compressible      _____________________________________________________  STUDIES REVIEWED:  I personally reviewed the images and interpretation is as follows:  X-rays of the left knee demonstrate no acute fractures or dislocations. No degenerative changes.   No bony avulsions    PROCEDURES PERFORMED:  Procedures    Scribe Attestation    I,:   am acting as a scribe while in the presence of the attending physician.:       I,:   personally performed the services described in this documentation    as scribed in my presence.:

## 2023-10-31 ENCOUNTER — OFFICE VISIT (OUTPATIENT)
Dept: OBGYN CLINIC | Facility: CLINIC | Age: 43
End: 2023-10-31
Payer: MEDICARE

## 2023-10-31 VITALS — BODY MASS INDEX: 31.34 KG/M2 | HEIGHT: 68 IN | WEIGHT: 206.8 LBS

## 2023-10-31 DIAGNOSIS — M17.10 PATELLOFEMORAL ARTHRITIS: Primary | ICD-10-CM

## 2023-10-31 PROCEDURE — 20610 DRAIN/INJ JOINT/BURSA W/O US: CPT

## 2023-10-31 PROCEDURE — 99214 OFFICE O/P EST MOD 30 MIN: CPT

## 2023-10-31 RX ORDER — BUPIVACAINE HYDROCHLORIDE 5 MG/ML
6 INJECTION, SOLUTION EPIDURAL; INTRACAUDAL
Status: COMPLETED | OUTPATIENT
Start: 2023-10-31 | End: 2023-10-31

## 2023-10-31 RX ORDER — METHYLPREDNISOLONE ACETATE 40 MG/ML
1 INJECTION, SUSPENSION INTRA-ARTICULAR; INTRALESIONAL; INTRAMUSCULAR; SOFT TISSUE
Status: COMPLETED | OUTPATIENT
Start: 2023-10-31 | End: 2023-10-31

## 2023-10-31 RX ADMIN — METHYLPREDNISOLONE ACETATE 1 ML: 40 INJECTION, SUSPENSION INTRA-ARTICULAR; INTRALESIONAL; INTRAMUSCULAR; SOFT TISSUE at 14:30

## 2023-10-31 RX ADMIN — BUPIVACAINE HYDROCHLORIDE 6 ML: 5 INJECTION, SOLUTION EPIDURAL; INTRACAUDAL at 14:30

## 2023-10-31 NOTE — PROGRESS NOTES
Orthopaedics Office Visit -follow-up patient Visit    ASSESSMENT/PLAN:    Assessment:   #1 left gastrocnemius muscle belly tearing, resolving  2. Left knee patellofemoral arthritis    Plan:   1. Pt states the Meloxicam did not alleviate her pain, discussion had with patient to proceed with CSI into the left knee. 2.  Patient can continue to be weightbearing as tolerated activity as tolerated to the leftt lower extremity  3. Patient has signs and symptoms consistent with patellofemoral arthritis. Patient agreeable to CSI into the left knee, injection given today. 4.  Patient can follow-up on an as-needed basis    To Do Next Visit:  As needed    _____________________________________________________  CHIEF COMPLAINT:  Chief Complaint   Patient presents with    Left Knee - Follow-up     Took the meloxicam for a month and has had no relief         SUBJECTIVE:  Carolina Maxwell is a 39 y.o. female who presents today consultation for left lower leg pain. She was referred by Dr. Dalia Perry. Patient was seen at the ED on 6/20/23 due to having left calf pain. She states states on 6/20/23 she was kneeling cleaning a bathtub at work and went to stand up and felt a two popping behind her left knee and calf pain. At the ED she had a venus duplex on and showed evidence of calf tear and prescribed Diclofenac gel. She states she is having pain medial left calf and left knee swelling to the ankle. She notes left knee cracking but with no pain. Her pain is worse with weightbearing. She has been icing, taking Tylenol and using Voltaren gel with no relief. 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 She denies numbness or paresthesias. Interval history 9/19/2023  Patient is a 41-year-old female who presents today due to continued left knee pain. The patient was seen last for a left gastrocnemius muscle tear that she sustained while at work.   The patient states that over the past 3 months the pain in the posterior calf has resolved. She has been able to return to her normal activities. She does state that as the calf pain resolved she started to have pain in her left knee with a grinding sensation and occasional clicking located behind her kneecap. The patient has not undergone any specific treatment for this. The patient went to 2 physical therapy sessions but then stopped as she felt it was not helping her. She denies any numbness or paresthesias. Denies any secondary trauma's    Interval history 10/31/23  Patient is a 78-year-old female who presents today due to continued left knee pain. She has tried Meloxicam and Voltaren gel for her left knee pain with minimal relief. She states most of her pain is on the medial aspect of her knee. The pain is made worse by walking and not alleviated by anything. Denies numbness or paresthesias.      PAST MEDICAL HISTORY:  Past Medical History:   Diagnosis Date    Alcoholic intoxication without complication ([address]) 77/58/3035    Anxiety     Atypical squamous cell of undetermined significance of cervix 08/23/2019    Bipolar disorder ([address])     Chlamydia     Depression     Drug overdose 8/27/2016    Gonorrhea     History of mammogram 08/30/2019    Normal     HPV (human papilloma virus) infection 08/23/2019    Miscarriage     Papanicolaou smear for cervical cancer screening 08/15/2019    ASCUS; HPV pos 2019     Psychiatric illness     Suicidal ideation 8/27/2016    Suicide attempt ([address])        PAST SURGICAL HISTORY:  Past Surgical History:   Procedure Laterality Date    COLPOSCOPY      x2 while pregnant     WISDOM TOOTH EXTRACTION         FAMILY HISTORY:  Family History   Problem Relation Age of Onset    Suicidality Mother     Depression Mother     No Known Problems Father     No Known Problems Daughter     No Known Problems Maternal Grandmother     No Known Problems Maternal Grandfather     No Known Problems Paternal Grandmother     No Known Problems Paternal Grandfather No Known Problems Son     No Known Problems Son        SOCIAL HISTORY:  Social History     Tobacco Use    Smoking status: Every Day     Packs/day: 0.50     Years: 15.00     Total pack years: 7.50     Types: Cigarettes    Smokeless tobacco: Never   Vaping Use    Vaping Use: Never used   Substance Use Topics    Alcohol use: Yes     Comment: Drinks 1-2 drinks 1-2 X yearly    Drug use: Not Currently     Types: Cocaine, Marijuana     Comment: Last marijuana use around 6/24/23       MEDICATIONS:    Current Outpatient Medications:     meloxicam (Mobic) 15 mg tablet, Take 1 tablet (15 mg total) by mouth daily Discontinue GI upset occurs, Disp: 30 tablet, Rfl: 2    ALLERGIES:  No Known Allergies    REVIEW OF SYSTEMS:  MSK: Left leg   Neuro: None   Pertinent items are otherwise noted in HPI. A comprehensive review of systems was otherwise negative. LABS:  HgA1c:   Lab Results   Component Value Date    HGBA1C 5.5 12/09/2022     BMP:   Lab Results   Component Value Date    CALCIUM 7.5 (L) 06/29/2023    K 3.4 (L) 06/29/2023    CO2 21 06/29/2023     06/29/2023    BUN 2 (L) 06/29/2023    CREATININE 0.45 (L) 06/29/2023     CBC: No components found for: "CBC"    _____________________________________________________  PHYSICAL EXAMINATION:  Vital signs: Ht 5' 8" (1.727 m)   Wt 93.8 kg (206 lb 12.8 oz)   BMI 31.44 kg/m²   General: No acute distress, awake and alert  Psychiatric: Mood and affect appear appropriate  HEENT: Trachea Midline, No torticollis, no apparent facial trauma  Cardiovascular: No audible murmurs; Extremities appear perfused  Pulmonary: No audible wheezing or stridor  Skin: No open lesions; see further details (if any) below    MUSCULOSKELETAL EXAMINATION:  Extremities:\  The left lower extremity was exposed inspected. The skin overlying the left lower extremity is intact without any superficial abrasions or lacerations. The patient has tenderness medially and laterally parapatellar location.   The patient has no medial or lateral joint line tenderness. Patient has crepitus throughout range of motion of the patellofemoral joint and range of motion from 0 to 120 degrees. Patient's knee is stable to varus and valgus stress at 0 and 30 degrees. Negative anterior and posterior drawer testing. .  Intact extensor mechanism the patient's sensation is intact to light touch in the superficial peroneal, deep peroneal, sural, saphenous, plantar nerve distributions. Tibialis anterior, extensor hallux longus, gastrocnemius muscles intact. Limb is well-perfused. Compartment soft compressible      _____________________________________________________  STUDIES REVIEWED:  I personally reviewed the images and interpretation is as follows:  X-rays of the left knee demonstrate no acute fractures or dislocations. No degenerative changes. No bony avulsions    PROCEDURES PERFORMED:  Large joint arthrocentesis: L knee  Universal Protocol:  Consent: Verbal consent obtained.   Consent given by: patient  Timeout called at: 10/31/2023 3:03 PM.  Patient understanding: patient states understanding of the procedure being performed  Site marked: the operative site was marked  Supporting Documentation  Indications: pain   Procedure Details  Location: knee - L knee  Needle size: 22 G  Medications administered: 6 mL bupivacaine (PF) 0.5 %; 1 mL methylPREDNISolone acetate 40 mg/mL    Patient tolerance: patient tolerated the procedure well with no immediate complications  Dressing:  Sterile dressing applied          Scribe Attestation      I,:   am acting as a scribe while in the presence of the attending physician.:       I,:   personally performed the services described in this documentation    as scribed in my presence.:

## 2024-02-26 ENCOUNTER — OFFICE VISIT (OUTPATIENT)
Dept: OBGYN CLINIC | Facility: CLINIC | Age: 44
End: 2024-02-26
Payer: MEDICARE

## 2024-02-26 VITALS — BODY MASS INDEX: 31.34 KG/M2 | WEIGHT: 206.8 LBS | HEIGHT: 68 IN

## 2024-02-26 DIAGNOSIS — M25.562 LEFT KNEE PAIN, UNSPECIFIED CHRONICITY: ICD-10-CM

## 2024-02-26 DIAGNOSIS — M17.10 PATELLOFEMORAL ARTHRITIS: Primary | ICD-10-CM

## 2024-02-26 PROCEDURE — 20610 DRAIN/INJ JOINT/BURSA W/O US: CPT | Performed by: STUDENT IN AN ORGANIZED HEALTH CARE EDUCATION/TRAINING PROGRAM

## 2024-02-26 PROCEDURE — 99214 OFFICE O/P EST MOD 30 MIN: CPT | Performed by: STUDENT IN AN ORGANIZED HEALTH CARE EDUCATION/TRAINING PROGRAM

## 2024-02-26 RX ORDER — METHYLPREDNISOLONE ACETATE 40 MG/ML
1 INJECTION, SUSPENSION INTRA-ARTICULAR; INTRALESIONAL; INTRAMUSCULAR; SOFT TISSUE
Status: COMPLETED | OUTPATIENT
Start: 2024-02-26 | End: 2024-02-26

## 2024-02-26 RX ORDER — BUPIVACAINE HYDROCHLORIDE 7.5 MG/ML
4 INJECTION, SOLUTION EPIDURAL; RETROBULBAR
Status: COMPLETED | OUTPATIENT
Start: 2024-02-26 | End: 2024-02-26

## 2024-02-26 RX ADMIN — BUPIVACAINE HYDROCHLORIDE 4 ML: 7.5 INJECTION, SOLUTION EPIDURAL; RETROBULBAR at 17:30

## 2024-02-26 RX ADMIN — METHYLPREDNISOLONE ACETATE 1 ML: 40 INJECTION, SUSPENSION INTRA-ARTICULAR; INTRALESIONAL; INTRAMUSCULAR; SOFT TISSUE at 17:30

## 2024-02-26 NOTE — PROGRESS NOTES
Orthopaedics Office Visit -follow-up patient Visit    ASSESSMENT/PLAN:    Assessment:   #1 Left knee patellofemoral arthritis    Plan:   1.  Patient can continue to be weightbearing as tolerated activity as tolerated to the leftt lower extremity  3.  Patient has signs and symptoms consistent with patellofemoral arthritis. Patient agreeable to CSI into the left knee, injection given today.  4.  Patient can follow-up on an as-needed basis    To Do Next Visit:  As needed    _____________________________________________________  CHIEF COMPLAINT:  Chief Complaint   Patient presents with    Left Knee - Follow-up     Here for repeat injection.         SUBJECTIVE:  Lisette Mcgrath is a 44 y.o. female who presents today consultation for left lower leg pain. She was referred by Dr. Justin. Patient was seen at the ED on 6/20/23 due to having left calf pain. She states states on 6/20/23 she was kneeling cleaning a bathtub at work and went to stand up and felt a two popping behind her left knee and calf pain. At the ED she had a venus duplex on and showed evidence of calf tear and prescribed Diclofenac gel. She states she is having pain medial left calf and left knee swelling to the ankle.  She notes left knee cracking but with no pain.  Her pain is worse with weightbearing. She has been icing, taking Tylenol and using Voltaren gel with no relief. 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 She denies numbness or paresthesias.     Interval history 9/19/2023  Patient is a 43-year-old female who presents today due to continued left knee pain.  The patient was seen last for a left gastrocnemius muscle tear that she sustained while at work.  The patient states that over the past 3 months the pain in the posterior calf has resolved.  She has been able to return to her normal activities.  She does state that as the calf pain resolved she started to have pain in her left knee with a grinding sensation and  occasional clicking located behind her kneecap.  The patient has not undergone any specific treatment for this.  The patient went to 2 physical therapy sessions but then stopped as she felt it was not helping her.  She denies any numbness or paresthesias.  Denies any secondary trauma's    Interval history 10/31/23  Patient is a 43-year-old female who presents today due to continued left knee pain. She has tried Meloxicam and Voltaren gel for her left knee pain with minimal relief. She states most of her pain is on the medial aspect of her knee. The pain is made worse by walking and not alleviated by anything. Denies numbness or paresthesias.     Interval history 2/26/2024  Patient is a 44-year-old female who presents today due to continued left knee pain.  Patient was last seen on 10/31 of last year, at which time she received a corticosteroid injection.  Patient states that the pain was relieved for approximately 3.5 months, and returned last week when she was working.  Most of her pain is at the medial aspect of her knee.  Pain is made worse by walking, not relieved by anything.  She has no numbness or paresthesias of the left lower extremity.    PAST MEDICAL HISTORY:  Past Medical History:   Diagnosis Date    Alcoholic intoxication without complication ([facility]) 11/24/2016    Anxiety     Atypical squamous cell of undetermined significance of cervix 08/23/2019    Bipolar disorder ([facility])     Chlamydia     Depression     Drug overdose 8/27/2016    Gonorrhea     History of mammogram 08/30/2019    Normal     HPV (human papilloma virus) infection 08/23/2019    Miscarriage     Papanicolaou smear for cervical cancer screening 08/15/2019    ASCUS; HPV pos 2019     Psychiatric illness     Suicidal ideation 8/27/2016    Suicide attempt ([facility])        PAST SURGICAL HISTORY:  Past Surgical History:   Procedure Laterality Date    COLPOSCOPY      x2 while pregnant     WISDOM TOOTH EXTRACTION         FAMILY HISTORY:  Family History  "  Problem Relation Age of Onset    Suicidality Mother     Depression Mother     No Known Problems Father     No Known Problems Daughter     No Known Problems Maternal Grandmother     No Known Problems Maternal Grandfather     No Known Problems Paternal Grandmother     No Known Problems Paternal Grandfather     No Known Problems Son     No Known Problems Son        SOCIAL HISTORY:  Social History     Tobacco Use    Smoking status: Every Day     Current packs/day: 0.50     Average packs/day: 0.5 packs/day for 15.0 years (7.5 ttl pk-yrs)     Types: Cigarettes    Smokeless tobacco: Never   Vaping Use    Vaping status: Never Used   Substance Use Topics    Alcohol use: Yes     Comment: Drinks 1-2 drinks 1-2 X yearly    Drug use: Not Currently     Types: Cocaine, Marijuana     Comment: Last marijuana use around 6/24/23       MEDICATIONS:    Current Outpatient Medications:     meloxicam (Mobic) 15 mg tablet, Take 1 tablet (15 mg total) by mouth daily Discontinue GI upset occurs (Patient not taking: Reported on 2/26/2024), Disp: 30 tablet, Rfl: 2    ALLERGIES:  No Known Allergies    REVIEW OF SYSTEMS:  MSK: Left leg   Neuro: None   Pertinent items are otherwise noted in HPI.  A comprehensive review of systems was otherwise negative.    LABS:  HgA1c:   Lab Results   Component Value Date    HGBA1C 5.5 12/09/2022     BMP:   Lab Results   Component Value Date    CALCIUM 9.0 07/11/2023    K 4.0 07/11/2023    CO2 25 07/11/2023     07/11/2023    BUN 5 (L) 07/11/2023    CREATININE 0.63 07/11/2023     CBC: No components found for: \"CBC\"    _____________________________________________________  PHYSICAL EXAMINATION:  Vital signs: Ht 5' 8\" (1.727 m)   Wt 93.8 kg (206 lb 12.8 oz)   BMI 31.44 kg/m²     Extremities: Left lower extremity  The left lower extremity was exposed and inspected.  The skin overlying the left lower extremity is intact without any superficial abrasions or lacerations.  The patient has tenderness medially " and laterally as well as parapatellar line location.  Patient has some medial joint line tenderness, no lateral joint line tenderness.  Patient has crepitus throughout range of motion of the patellofemoral joint, and has range of motion from 0 to 120 degrees.  Knee is stable to varus and valgus stress at 0 and 30 degrees.  Patient has negative anterior and posterior drawer tests.  She has intact extensor mechanism.  Patient sensation is intact to light touch in the superficial peroneal, deep peroneal, sural, saphenous, plantar nerve distributions.  Patient is motor intact to the tibialis anterior, EHL, and gastrocnemius muscles.  Her limb is well-perfused.  Compartments are soft and compressible.    _____________________________________________________  STUDIES REVIEWED:  I personally reviewed the images and interpretation is as follows:  No new imaging taken today.    PROCEDURES PERFORMED:  Large joint arthrocentesis: L knee  Universal Protocol:  Consent: Verbal consent obtained.  Consent given by: patient  Patient understanding: patient states understanding of the procedure being performed  Patient identity confirmed: verbally with patient  Supporting Documentation  Indications: pain   Procedure Details  Location: knee - L knee  Needle size: 25 G  Ultrasound guidance: no  Approach: lateral  Medications administered: 1 mL methylPREDNISolone acetate 40 mg/mL; 4 mL bupivacaine (PF) 0.75 %    Patient tolerance: patient tolerated the procedure well with no immediate complications  Dressing:  Sterile dressing applied          Scribe Attestation      I,:   am acting as a scribe while in the presence of the attending physician.:       I,:   personally performed the services described in this documentation    as scribed in my presence.:

## 2024-10-10 ENCOUNTER — OFFICE VISIT (OUTPATIENT)
Dept: OBGYN CLINIC | Facility: CLINIC | Age: 44
End: 2024-10-10
Payer: MEDICARE

## 2024-10-10 VITALS
BODY MASS INDEX: 30.92 KG/M2 | DIASTOLIC BLOOD PRESSURE: 86 MMHG | HEIGHT: 68 IN | SYSTOLIC BLOOD PRESSURE: 122 MMHG | WEIGHT: 204 LBS

## 2024-10-10 DIAGNOSIS — N92.0 MENORRHAGIA WITH REGULAR CYCLE: ICD-10-CM

## 2024-10-10 DIAGNOSIS — Z11.3 SCREEN FOR STD (SEXUALLY TRANSMITTED DISEASE): ICD-10-CM

## 2024-10-10 DIAGNOSIS — Z01.411 ENCOUNTER FOR GYNECOLOGICAL EXAMINATION WITH ABNORMAL FINDING: Primary | ICD-10-CM

## 2024-10-10 DIAGNOSIS — R92.8 ABNORMAL MAMMOGRAM: ICD-10-CM

## 2024-10-10 PROCEDURE — G0145 SCR C/V CYTO,THINLAYER,RESCR: HCPCS | Performed by: PHYSICIAN ASSISTANT

## 2024-10-10 PROCEDURE — 87591 N.GONORRHOEAE DNA AMP PROB: CPT | Performed by: PHYSICIAN ASSISTANT

## 2024-10-10 PROCEDURE — 99396 PREV VISIT EST AGE 40-64: CPT | Performed by: PHYSICIAN ASSISTANT

## 2024-10-10 PROCEDURE — G0476 HPV COMBO ASSAY CA SCREEN: HCPCS | Performed by: PHYSICIAN ASSISTANT

## 2024-10-10 PROCEDURE — 87491 CHLMYD TRACH DNA AMP PROBE: CPT | Performed by: PHYSICIAN ASSISTANT

## 2024-10-10 RX ORDER — LANSOPRAZOLE 30 MG/1
30 CAPSULE, DELAYED RELEASE ORAL 2 TIMES DAILY
COMMUNITY
Start: 2024-08-09

## 2024-10-10 RX ORDER — CYCLOBENZAPRINE HCL 10 MG
TABLET ORAL
COMMUNITY

## 2024-10-10 NOTE — PATIENT INSTRUCTIONS
Go for mammogram.    Go for pelvic ultrasound and blood work.    Follow up for endometrial biopsy.    Consider options for treatment of heavy periods.    Call with problems.

## 2024-10-10 NOTE — PROGRESS NOTES
Assessment/Plan:      Diagnoses and all orders for this visit:    Encounter for gynecological examination with abnormal finding  -     Liquid-based pap, screening    Menorrhagia with regular cycle  -     US pelvis complete w transvaginal; Future  -     CBC and differential  -     Iron Panel (Includes Ferritin, Iron Sat%, Iron, and TIBC); Future    Screen for STD (sexually transmitted disease)  -     Chlamydia/GC amplified DNA by PCR    Abnormal mammogram  -     Mammo diagnostic bilateral w 3d and cad; Future    Other orders  -     cyclobenzaprine (FLEXERIL) 10 mg tablet; TAKE 1 TABLET AT BEDTIME FOR 1 WEEK AND THEN NIGHTLY AS NEEDED FOR SPASM, MAY CAUSE GROGGINESS  -     lansoprazole (PREVACID) 30 mg capsule; Take 30 mg by mouth 2 (two) times a day        Pap and GC/chlamydia screening done.  We will call with STD testing results.  Order for diagnostic mammogram entered.  Orders for pelvic U/S and CBC/iron panel entered.  Patient will need to f/u for endometrial biopsy.  Briefly discussed treatment for menorrhagia - IUD, TXA, endometrial ablation, and hysterectomy.  Patient to consider.  Call with problems in the interim.    Subjective:     Patient ID: Lisette Mcgrath is a 44 y.o. female.    Patient is here for yearly gyn exam.  States she is doing well overall.  Periods are regular every 25 days, and bleeding lasts for 7-10 days.  Flow is heavy for 4-5 days with clots. Also complains of nausea and severe dysmenorrhea.  This has been going on for the last 7 mos.  Patient states she has a history of fibroids.  Notes some hot flashes.  She is sexually active; requests STD screening.  Denies bowel/bladder changes, pelvic pain, bloating, abdominal pain, n/v, change in appetite, and thyroid disease.  Up to date on her colonoscopy.    Patient is overdue for mammogram.  Last imaging in 2022 showed b/l asymmetries, but patient did not go for follow up.  Denies new masses, skin changes, nipple discharge, and pain/tenderness.   "Family cancer history negative.      Review of Systems   Constitutional:  Positive for diaphoresis (Hot flashes). Negative for appetite change and unexpected weight change.   Cardiovascular:         No masses, skin changes, nipple discharge, and pain/tenderness.   Gastrointestinal:  Negative for abdominal distention, abdominal pain, constipation, diarrhea, nausea and vomiting.   Genitourinary:  Positive for menstrual problem (Menorrhagia; dysmenorrhea). Negative for difficulty urinating, dysuria, frequency, genital sores, hematuria, pelvic pain, urgency, vaginal bleeding, vaginal discharge and vaginal pain.         Objective:  Visit Vitals  /86 (BP Location: Left arm, Patient Position: Sitting, Cuff Size: Adult)   Ht 5' 8\" (1.727 m)   Wt 92.5 kg (204 lb)   LMP 09/27/2024 (Exact Date)   BMI 31.02 kg/m²   OB Status Having periods   Smoking Status Every Day   BSA 2.06 m²         Physical Exam  Vitals reviewed. Exam conducted with a chaperone present.   Constitutional:       Appearance: Normal appearance. She is well-developed.   Neck:      Thyroid: No thyromegaly.   Pulmonary:      Effort: Pulmonary effort is normal.   Chest:   Breasts:     Breasts are symmetrical.      Right: Normal. No swelling, bleeding, inverted nipple, mass, nipple discharge, skin change or tenderness.      Left: Normal. No swelling, bleeding, inverted nipple, mass, nipple discharge, skin change or tenderness.   Abdominal:      General: There is no distension.      Palpations: Abdomen is soft.      Tenderness: There is no abdominal tenderness.   Genitourinary:     General: Normal vulva.      Pubic Area: No rash.       Labia:         Right: No rash, tenderness, lesion or injury.         Left: No rash, tenderness, lesion or injury.       Vagina: Normal. No vaginal discharge, erythema, tenderness or bleeding.      Cervix: Normal.      Uterus: Normal.       Adnexa: Right adnexa normal and left adnexa normal.        Right: No mass, tenderness or " fullness.          Left: No mass, tenderness or fullness.     Musculoskeletal:      Cervical back: Neck supple.   Lymphadenopathy:      Cervical: No cervical adenopathy.      Upper Body:      Right upper body: No supraclavicular or axillary adenopathy.      Left upper body: No supraclavicular or axillary adenopathy.      Lower Body: No right inguinal adenopathy. No left inguinal adenopathy.   Skin:     General: Skin is warm and dry.   Neurological:      Mental Status: She is alert and oriented to person, place, and time.   Psychiatric:         Behavior: Behavior normal. Behavior is cooperative.         Thought Content: Thought content normal.         Judgment: Judgment normal.

## 2024-10-16 LAB
LAB AP GYN PRIMARY INTERPRETATION: NORMAL
Lab: NORMAL

## 2024-10-18 ENCOUNTER — APPOINTMENT (OUTPATIENT)
Dept: LAB | Facility: HOSPITAL | Age: 44
End: 2024-10-18
Payer: MEDICARE

## 2024-10-18 ENCOUNTER — HOSPITAL ENCOUNTER (OUTPATIENT)
Dept: ULTRASOUND IMAGING | Facility: HOSPITAL | Age: 44
End: 2024-10-18
Payer: MEDICARE

## 2024-10-18 DIAGNOSIS — N92.0 MENORRHAGIA WITH REGULAR CYCLE: ICD-10-CM

## 2024-10-18 LAB
BASOPHILS # BLD AUTO: 0.02 THOUSANDS/ΜL (ref 0–0.1)
BASOPHILS NFR BLD AUTO: 0 % (ref 0–1)
EOSINOPHIL # BLD AUTO: 0.13 THOUSAND/ΜL (ref 0–0.61)
EOSINOPHIL NFR BLD AUTO: 2 % (ref 0–6)
ERYTHROCYTE [DISTWIDTH] IN BLOOD BY AUTOMATED COUNT: 18.4 % (ref 11.6–15.1)
HCT VFR BLD AUTO: 37.6 % (ref 34.8–46.1)
HGB BLD-MCNC: 11.8 G/DL (ref 11.5–15.4)
IMM GRANULOCYTES # BLD AUTO: 0.03 THOUSAND/UL (ref 0–0.2)
IMM GRANULOCYTES NFR BLD AUTO: 0 % (ref 0–2)
IRON SATN MFR SERPL: 7 % (ref 15–50)
IRON SERPL-MCNC: 24 UG/DL (ref 50–212)
LYMPHOCYTES # BLD AUTO: 2.46 THOUSANDS/ΜL (ref 0.6–4.47)
LYMPHOCYTES NFR BLD AUTO: 30 % (ref 14–44)
MCH RBC QN AUTO: 28.4 PG (ref 26.8–34.3)
MCHC RBC AUTO-ENTMCNC: 31.4 G/DL (ref 31.4–37.4)
MCV RBC AUTO: 90 FL (ref 82–98)
MONOCYTES # BLD AUTO: 0.72 THOUSAND/ΜL (ref 0.17–1.22)
MONOCYTES NFR BLD AUTO: 9 % (ref 4–12)
NEUTROPHILS # BLD AUTO: 4.92 THOUSANDS/ΜL (ref 1.85–7.62)
NEUTS SEG NFR BLD AUTO: 59 % (ref 43–75)
NRBC BLD AUTO-RTO: 0 /100 WBCS
PLATELET # BLD AUTO: 255 THOUSANDS/UL (ref 149–390)
PMV BLD AUTO: 11.3 FL (ref 8.9–12.7)
RBC # BLD AUTO: 4.16 MILLION/UL (ref 3.81–5.12)
TIBC SERPL-MCNC: 367 UG/DL (ref 250–450)
UIBC SERPL-MCNC: 343 UG/DL (ref 155–355)
WBC # BLD AUTO: 8.28 THOUSAND/UL (ref 4.31–10.16)

## 2024-10-18 PROCEDURE — 76830 TRANSVAGINAL US NON-OB: CPT

## 2024-10-18 PROCEDURE — 83540 ASSAY OF IRON: CPT

## 2024-10-18 PROCEDURE — 76856 US EXAM PELVIC COMPLETE: CPT

## 2024-10-18 PROCEDURE — 82728 ASSAY OF FERRITIN: CPT

## 2024-10-18 PROCEDURE — 83550 IRON BINDING TEST: CPT

## 2024-10-18 PROCEDURE — 85025 COMPLETE CBC W/AUTO DIFF WBC: CPT | Performed by: PHYSICIAN ASSISTANT

## 2024-10-18 PROCEDURE — 36415 COLL VENOUS BLD VENIPUNCTURE: CPT

## 2024-10-19 LAB — FERRITIN SERPL-MCNC: 8 NG/ML (ref 11–307)

## 2024-10-21 ENCOUNTER — TELEPHONE (OUTPATIENT)
Dept: OBGYN CLINIC | Facility: CLINIC | Age: 44
End: 2024-10-21

## 2024-10-21 DIAGNOSIS — D50.9 IRON DEFICIENCY ANEMIA, UNSPECIFIED IRON DEFICIENCY ANEMIA TYPE: Primary | ICD-10-CM

## 2024-10-21 NOTE — TELEPHONE ENCOUNTER
Spoke with patient regarding blood work results - iron deficiency anemia.  Patient has ferritin of 8.  Will start OTC oral iron.  Referral to hematology placed for possible iron infusions.  Has endometrial biopsy on 10/23.  Then can discuss treatment for periods.  Call with further questions.

## 2024-10-23 ENCOUNTER — PROCEDURE VISIT (OUTPATIENT)
Dept: OBGYN CLINIC | Facility: CLINIC | Age: 44
End: 2024-10-23
Payer: MEDICARE

## 2024-10-23 VITALS
DIASTOLIC BLOOD PRESSURE: 74 MMHG | HEIGHT: 68 IN | SYSTOLIC BLOOD PRESSURE: 110 MMHG | WEIGHT: 203.2 LBS | BODY MASS INDEX: 30.8 KG/M2

## 2024-10-23 DIAGNOSIS — N92.0 MENORRHAGIA WITH REGULAR CYCLE: Primary | ICD-10-CM

## 2024-10-23 PROCEDURE — 88305 TISSUE EXAM BY PATHOLOGIST: CPT | Performed by: STUDENT IN AN ORGANIZED HEALTH CARE EDUCATION/TRAINING PROGRAM

## 2024-10-23 PROCEDURE — 58100 BIOPSY OF UTERUS LINING: CPT | Performed by: OBSTETRICS & GYNECOLOGY

## 2024-10-23 RX ORDER — FERROUS SULFATE 325(65) MG
325 TABLET ORAL
COMMUNITY

## 2024-10-23 RX ORDER — TRANEXAMIC ACID 650 MG/1
1300 TABLET ORAL 3 TIMES DAILY
Qty: 30 TABLET | Refills: 2 | Status: SHIPPED | OUTPATIENT
Start: 2024-10-23 | End: 2024-10-28

## 2024-10-24 ENCOUNTER — TELEPHONE (OUTPATIENT)
Dept: OBGYN CLINIC | Facility: CLINIC | Age: 44
End: 2024-10-24

## 2024-10-24 NOTE — PROGRESS NOTES
"Assessment/Plan:     Diagnoses and all orders for this visit:    Menorrhagia with regular cycle  -     Tranexamic Acid 650 MG TABS; Take 2 tablets (1,300 mg total) by mouth 3 (three) times a day for 5 days  -     Tissue Exam    Other orders  -     ferrous sulfate 325 (65 Fe) mg tablet; Take 325 mg by mouth daily with breakfast        44-year-old female  Menorrhagia  Smoker  History of bipolar/depression  History of abnormal Pap smear  Had tubal ligation  Plan  Endometrial biopsy done today  TXA risk-benefit side effect reviewed and discussed with patient  Return to office 3 months follow-up  Subjective:      Patient ID: Lisette Mcgrath is a 44 y.o. female.    HPI  Patient seen evaluated present to the office today for endometrial biopsy secondary to menorrhagia  Patient has history of menorrhagia with regular cycle menses lasted for 7 days 4 days is very heavy and passing clots  Patient has ultrasound imaging reviewed and discussed with patient  Final results still pending  Endometrial biopsy procedure reviewed and discussed with patient  Management option for menorrhagia reviewed and discussed with patient option given for oral contraceptive pills, Mirena IUD, ablation procedure, tranexamic acid patient is interested in trying TXA risk-benefit side effect reviewed and discussed with patient all patient questions answered and patient was satisfied      The following portions of the patient's history were reviewed and updated as appropriate: allergies, current medications, past family history, past medical history, past social history, past surgical history and problem list.    Review of Systems      Objective:      /74 (BP Location: Left arm, Patient Position: Sitting, Cuff Size: Adult)   Ht 5' 8\" (1.727 m)   Wt 92.2 kg (203 lb 3.2 oz)   LMP 09/27/2024 (Exact Date)   BMI 30.90 kg/m²          Physical Exam      Endometrial biopsy    Date/Time: 10/23/2024 1:45 PM    Performed by: Judith Watt, " "MD  Authorized by: Judith Watt MD  Universal Protocol:  Consent: Verbal consent obtained. Written consent obtained.  Risks and benefits: risks, benefits and alternatives were discussed  Consent given by: patient  Time out: Immediately prior to procedure a \"time out\" was called to verify the correct patient, procedure, equipment, support staff and site/side marked as required.  Patient understanding: patient states understanding of the procedure being performed  Patient consent: the patient's understanding of the procedure matches consent given  Procedure consent: procedure consent matches procedure scheduled  Relevant documents: relevant documents present and verified  Patient identity confirmed: verbally with patient    Procedure:     Procedure: endometrial biopsy with Pipelle      A bivalve speculum was placed in the vagina: yes      Cervix cleaned and prepped: yes      The cervix was dilated: no      Uterus sounded: no      Specimen collected: specimen collected and sent to pathology      Patient tolerated procedure well with no complications: yes    Findings:     Uterus size:  Non-gravid    Cervix: normal      Adnexa: normal        "

## 2024-10-24 NOTE — TELEPHONE ENCOUNTER
Spoke with patient regarding results of pelvic U/S.  Multiple fibroids seen; only one has increased in size.  Endometrium and ovaries WNL.  Had endometrial biopsy yesterday.  Will be trying TXA for bleeding.  Will continue to monitor.  Call with further questions.

## 2024-10-28 PROCEDURE — 88305 TISSUE EXAM BY PATHOLOGIST: CPT | Performed by: STUDENT IN AN ORGANIZED HEALTH CARE EDUCATION/TRAINING PROGRAM

## 2024-10-29 ENCOUNTER — TELEPHONE (OUTPATIENT)
Age: 44
End: 2024-10-29

## 2024-10-29 NOTE — TELEPHONE ENCOUNTER
----- Message from Judith Watt MD sent at 10/28/2024 10:58 AM EDT -----  Patient has benign endometrial biopsy to try TXA return to office in 3 months for follow-up on her bleeding please make sure appointment scheduled

## 2024-12-18 ENCOUNTER — TELEPHONE (OUTPATIENT)
Dept: HEMATOLOGY ONCOLOGY | Facility: CLINIC | Age: 44
End: 2024-12-18

## 2024-12-18 NOTE — TELEPHONE ENCOUNTER
Pt was rescheduled to 2/5  at 9 am with Albertina Rausch. Pt states original appt was 12/26 and got moved up, now cancelled. She is not happy. Would like a call. Inbasket was sent to leadership